# Patient Record
Sex: FEMALE | Race: WHITE | NOT HISPANIC OR LATINO | Employment: FULL TIME | URBAN - METROPOLITAN AREA
[De-identification: names, ages, dates, MRNs, and addresses within clinical notes are randomized per-mention and may not be internally consistent; named-entity substitution may affect disease eponyms.]

---

## 2017-01-01 ENCOUNTER — TELEPHONE (OUTPATIENT)
Dept: ONCOLOGY | Facility: CLINIC | Age: 57
End: 2017-01-01

## 2017-01-01 ENCOUNTER — INFUSION (OUTPATIENT)
Dept: ONCOLOGY | Facility: HOSPITAL | Age: 57
End: 2017-01-01

## 2017-01-01 ENCOUNTER — OFFICE VISIT (OUTPATIENT)
Dept: ONCOLOGY | Facility: CLINIC | Age: 57
End: 2017-01-01

## 2017-01-01 ENCOUNTER — LAB (OUTPATIENT)
Dept: LAB | Facility: HOSPITAL | Age: 57
End: 2017-01-01

## 2017-01-01 ENCOUNTER — HOSPITAL ENCOUNTER (OUTPATIENT)
Dept: MRI IMAGING | Facility: HOSPITAL | Age: 57
Discharge: HOME | End: 2017-07-17
Attending: INTERNAL MEDICINE | Admitting: INTERNAL MEDICINE

## 2017-01-01 ENCOUNTER — HOSPITAL ENCOUNTER (OUTPATIENT)
Dept: RADIATION ONCOLOGY | Facility: HOSPITAL | Age: 57
Setting detail: RADIATION/ONCOLOGY SERIES
Discharge: HOME | End: 2017-01-05

## 2017-01-01 ENCOUNTER — HOSPITAL ENCOUNTER (OUTPATIENT)
Dept: MRI IMAGING | Facility: HOSPITAL | Age: 57
Discharge: HOME | End: 2017-02-13
Attending: INTERNAL MEDICINE | Admitting: INTERNAL MEDICINE

## 2017-01-01 ENCOUNTER — HOSPITAL ENCOUNTER (OUTPATIENT)
Dept: MRI IMAGING | Facility: HOSPITAL | Age: 57
Discharge: HOME | End: 2017-04-10
Attending: INTERNAL MEDICINE | Admitting: INTERNAL MEDICINE

## 2017-01-01 ENCOUNTER — OFFICE VISIT (OUTPATIENT)
Dept: RADIATION ONCOLOGY | Facility: HOSPITAL | Age: 57
End: 2017-01-01

## 2017-01-01 ENCOUNTER — OFFICE VISIT (OUTPATIENT)
Dept: SURGERY | Facility: CLINIC | Age: 57
End: 2017-01-01

## 2017-01-01 ENCOUNTER — HOSPITAL ENCOUNTER (OUTPATIENT)
Dept: ONCOLOGY | Facility: HOSPITAL | Age: 57
Setting detail: INFUSION SERIES
Discharge: HOME | End: 2017-08-25

## 2017-01-01 VITALS
BODY MASS INDEX: 32.28 KG/M2 | RESPIRATION RATE: 16 BRPM | HEART RATE: 67 BPM | DIASTOLIC BLOOD PRESSURE: 67 MMHG | TEMPERATURE: 97.1 F | WEIGHT: 200 LBS | TEMPERATURE: 98 F | HEART RATE: 75 BPM | DIASTOLIC BLOOD PRESSURE: 90 MMHG | SYSTOLIC BLOOD PRESSURE: 163 MMHG | WEIGHT: 208 LBS | BODY MASS INDEX: 33.57 KG/M2 | SYSTOLIC BLOOD PRESSURE: 131 MMHG | RESPIRATION RATE: 16 BRPM

## 2017-01-01 VITALS
DIASTOLIC BLOOD PRESSURE: 63 MMHG | RESPIRATION RATE: 16 BRPM | TEMPERATURE: 97.1 F | BODY MASS INDEX: 30.85 KG/M2 | HEART RATE: 81 BPM | WEIGHT: 208 LBS | DIASTOLIC BLOOD PRESSURE: 77 MMHG | BODY MASS INDEX: 33.57 KG/M2 | WEIGHT: 197 LBS | SYSTOLIC BLOOD PRESSURE: 125 MMHG | HEART RATE: 71 BPM | SYSTOLIC BLOOD PRESSURE: 146 MMHG | RESPIRATION RATE: 19 BRPM | TEMPERATURE: 97.1 F

## 2017-01-01 VITALS — SYSTOLIC BLOOD PRESSURE: 174 MMHG | DIASTOLIC BLOOD PRESSURE: 78 MMHG

## 2017-01-01 VITALS
TEMPERATURE: 97.5 F | RESPIRATION RATE: 19 BRPM | WEIGHT: 212 LBS | DIASTOLIC BLOOD PRESSURE: 78 MMHG | BODY MASS INDEX: 34.22 KG/M2 | SYSTOLIC BLOOD PRESSURE: 153 MMHG | HEART RATE: 73 BPM

## 2017-01-01 VITALS
HEIGHT: 66 IN | OXYGEN SATURATION: 98 % | DIASTOLIC BLOOD PRESSURE: 70 MMHG | WEIGHT: 207 LBS | TEMPERATURE: 97.7 F | BODY MASS INDEX: 33.27 KG/M2 | HEART RATE: 84 BPM | SYSTOLIC BLOOD PRESSURE: 102 MMHG

## 2017-01-01 VITALS
HEART RATE: 76 BPM | DIASTOLIC BLOOD PRESSURE: 60 MMHG | TEMPERATURE: 97.4 F | SYSTOLIC BLOOD PRESSURE: 125 MMHG | RESPIRATION RATE: 18 BRPM

## 2017-01-01 VITALS
RESPIRATION RATE: 19 BRPM | HEART RATE: 70 BPM | SYSTOLIC BLOOD PRESSURE: 151 MMHG | DIASTOLIC BLOOD PRESSURE: 67 MMHG | DIASTOLIC BLOOD PRESSURE: 87 MMHG | SYSTOLIC BLOOD PRESSURE: 143 MMHG | TEMPERATURE: 97.6 F | BODY MASS INDEX: 36.15 KG/M2 | WEIGHT: 224 LBS | WEIGHT: 213 LBS | TEMPERATURE: 97.6 F | BODY MASS INDEX: 34.38 KG/M2 | HEART RATE: 87 BPM | RESPIRATION RATE: 18 BRPM

## 2017-01-01 VITALS
SYSTOLIC BLOOD PRESSURE: 137 MMHG | DIASTOLIC BLOOD PRESSURE: 88 MMHG | BODY MASS INDEX: 37.45 KG/M2 | HEART RATE: 88 BPM | WEIGHT: 232 LBS | RESPIRATION RATE: 18 BRPM | TEMPERATURE: 98.7 F

## 2017-01-01 VITALS — SYSTOLIC BLOOD PRESSURE: 141 MMHG | DIASTOLIC BLOOD PRESSURE: 66 MMHG

## 2017-01-01 VITALS
WEIGHT: 187 LBS | HEIGHT: 67 IN | SYSTOLIC BLOOD PRESSURE: 138 MMHG | DIASTOLIC BLOOD PRESSURE: 92 MMHG | TEMPERATURE: 97.4 F | RESPIRATION RATE: 18 BRPM | BODY MASS INDEX: 29.35 KG/M2 | HEART RATE: 106 BPM

## 2017-01-01 VITALS
TEMPERATURE: 98.5 F | DIASTOLIC BLOOD PRESSURE: 70 MMHG | RESPIRATION RATE: 16 BRPM | BODY MASS INDEX: 34.7 KG/M2 | SYSTOLIC BLOOD PRESSURE: 136 MMHG | HEART RATE: 82 BPM | WEIGHT: 215 LBS

## 2017-01-01 VITALS — DIASTOLIC BLOOD PRESSURE: 72 MMHG | SYSTOLIC BLOOD PRESSURE: 126 MMHG | HEART RATE: 75 BPM

## 2017-01-01 VITALS
TEMPERATURE: 97.6 F | BODY MASS INDEX: 36.48 KG/M2 | RESPIRATION RATE: 16 BRPM | DIASTOLIC BLOOD PRESSURE: 84 MMHG | WEIGHT: 226 LBS | HEART RATE: 72 BPM | SYSTOLIC BLOOD PRESSURE: 182 MMHG

## 2017-01-01 VITALS
BODY MASS INDEX: 35.35 KG/M2 | TEMPERATURE: 97.1 F | WEIGHT: 219 LBS | HEART RATE: 91 BPM | DIASTOLIC BLOOD PRESSURE: 70 MMHG | RESPIRATION RATE: 18 BRPM | SYSTOLIC BLOOD PRESSURE: 128 MMHG

## 2017-01-01 VITALS
TEMPERATURE: 97.5 F | HEART RATE: 73 BPM | RESPIRATION RATE: 20 BRPM | DIASTOLIC BLOOD PRESSURE: 58 MMHG | SYSTOLIC BLOOD PRESSURE: 109 MMHG

## 2017-01-01 VITALS
SYSTOLIC BLOOD PRESSURE: 137 MMHG | RESPIRATION RATE: 16 BRPM | WEIGHT: 225 LBS | DIASTOLIC BLOOD PRESSURE: 76 MMHG | HEART RATE: 83 BPM | BODY MASS INDEX: 36.32 KG/M2 | TEMPERATURE: 97.8 F

## 2017-01-01 VITALS
HEART RATE: 82 BPM | TEMPERATURE: 98.1 F | WEIGHT: 228 LBS | HEIGHT: 66 IN | RESPIRATION RATE: 18 BRPM | SYSTOLIC BLOOD PRESSURE: 135 MMHG | BODY MASS INDEX: 36.64 KG/M2 | DIASTOLIC BLOOD PRESSURE: 63 MMHG

## 2017-01-01 VITALS
DIASTOLIC BLOOD PRESSURE: 72 MMHG | SYSTOLIC BLOOD PRESSURE: 145 MMHG | HEART RATE: 73 BPM | BODY MASS INDEX: 36.8 KG/M2 | RESPIRATION RATE: 18 BRPM | TEMPERATURE: 97.8 F | WEIGHT: 228 LBS

## 2017-01-01 VITALS
DIASTOLIC BLOOD PRESSURE: 84 MMHG | BODY MASS INDEX: 30.07 KG/M2 | HEART RATE: 77 BPM | WEIGHT: 192 LBS | RESPIRATION RATE: 19 BRPM | TEMPERATURE: 97.2 F | SYSTOLIC BLOOD PRESSURE: 183 MMHG

## 2017-01-01 VITALS
DIASTOLIC BLOOD PRESSURE: 85 MMHG | RESPIRATION RATE: 15 BRPM | WEIGHT: 216 LBS | BODY MASS INDEX: 34.86 KG/M2 | SYSTOLIC BLOOD PRESSURE: 132 MMHG | TEMPERATURE: 98 F | HEART RATE: 80 BPM

## 2017-01-01 VITALS — DIASTOLIC BLOOD PRESSURE: 73 MMHG | HEART RATE: 68 BPM | SYSTOLIC BLOOD PRESSURE: 127 MMHG

## 2017-01-01 VITALS — DIASTOLIC BLOOD PRESSURE: 78 MMHG | SYSTOLIC BLOOD PRESSURE: 134 MMHG | HEART RATE: 82 BPM

## 2017-01-01 VITALS
TEMPERATURE: 97.8 F | SYSTOLIC BLOOD PRESSURE: 174 MMHG | RESPIRATION RATE: 18 BRPM | HEART RATE: 73 BPM | DIASTOLIC BLOOD PRESSURE: 74 MMHG

## 2017-01-01 VITALS
DIASTOLIC BLOOD PRESSURE: 74 MMHG | SYSTOLIC BLOOD PRESSURE: 153 MMHG | WEIGHT: 224 LBS | RESPIRATION RATE: 16 BRPM | BODY MASS INDEX: 36.15 KG/M2 | HEART RATE: 73 BPM | TEMPERATURE: 97.3 F

## 2017-01-01 VITALS — DIASTOLIC BLOOD PRESSURE: 64 MMHG | SYSTOLIC BLOOD PRESSURE: 136 MMHG

## 2017-01-01 DIAGNOSIS — C71.9 GBM (GLIOBLASTOMA MULTIFORME) (HCC): Primary | ICD-10-CM

## 2017-01-01 DIAGNOSIS — C71.9 GLIOBLASTOMA MULTIFORME (HCC): Primary | ICD-10-CM

## 2017-01-01 DIAGNOSIS — C71.9 GLIOBLASTOMA MULTIFORME (HCC): ICD-10-CM

## 2017-01-01 DIAGNOSIS — E86.0 DEHYDRATION: ICD-10-CM

## 2017-01-01 DIAGNOSIS — G93.89 BRAIN MASS: ICD-10-CM

## 2017-01-01 DIAGNOSIS — E86.0 DEHYDRATION: Primary | ICD-10-CM

## 2017-01-01 DIAGNOSIS — R00.1 BRADYCARDIA: Primary | ICD-10-CM

## 2017-01-01 DIAGNOSIS — K52.9 CHRONIC DIARRHEA: Primary | ICD-10-CM

## 2017-01-01 LAB
ANION GAP SERPL CALCULATED.3IONS-SCNC: 9 MMOL/L (ref 3–11)
AUTO MIXED CELLS #: 0.6 10*3/UL (ref 0.1–1.5)
AUTO MIXED CELLS %: 12.6 % (ref 0–16)
BILIRUB UR QL STRIP: NEGATIVE
BUN BLD-MCNC: 14 MG/DL (ref 9–23)
BUN/CREAT SERPL: 15.6 (ref 7–25)
CALCIUM SPEC-SCNC: 9.2 MG/DL (ref 8.7–10.4)
CHLORIDE SERPL-SCNC: 101 MMOL/L (ref 99–109)
CLARITY UR: CLEAR
CO2 SERPL-SCNC: 30 MMOL/L (ref 20–31)
COLOR UR: YELLOW
CREAT BLD-MCNC: 0.9 MG/DL (ref 0.6–1.3)
CREAT BLDA-MCNC: 0.8 MG/DL (ref 0.6–1.3)
CREAT BLDA-MCNC: 0.9 MG/DL (ref 0.6–1.3)
CREAT BLDA-MCNC: 1 MG/DL (ref 0.6–1.3)
CREAT SERPL-MCNC: 0.8 MG/DL
ERYTHROCYTE [DISTWIDTH] IN BLOOD BY AUTOMATED COUNT: 14.4 % (ref 11.3–14.5)
GFR SERPL CREATININE-BSD FRML MDRD: 65 ML/MIN/1.73
GLUCOSE BLD-MCNC: 196 MG/DL (ref 70–100)
GLUCOSE UR STRIP-MCNC: NEGATIVE MG/DL
HCT VFR BLD AUTO: 36.4 % (ref 34.5–44)
HGB BLD-MCNC: 12.9 G/DL (ref 11.5–15.5)
HGB UR QL STRIP.AUTO: NEGATIVE
KETONES UR QL STRIP: NEGATIVE
LEUKOCYTE ESTERASE UR QL STRIP.AUTO: NEGATIVE
LYMPHOCYTES # BLD AUTO: 1.5 10*3/MM3 (ref 0.6–4.8)
LYMPHOCYTES NFR BLD AUTO: 30.3 % (ref 24–44)
MCH RBC QN AUTO: 30.6 PG (ref 27–31)
MCHC RBC AUTO-ENTMCNC: 35.4 G/DL (ref 32–36)
MCV RBC AUTO: 86.5 FL (ref 80–99)
NEUTROPHILS # BLD AUTO: 2.9 10*3/MM3 (ref 1.5–8.3)
NEUTROPHILS NFR BLD AUTO: 57.1 % (ref 41–71)
NITRITE UR QL STRIP: NEGATIVE
PH UR STRIP.AUTO: 5.5 [PH] (ref 5–8)
PLATELET # BLD AUTO: 168 10*3/MM3 (ref 150–450)
PMV BLD AUTO: 9.3 FL (ref 6–12)
POTASSIUM BLD-SCNC: 3.6 MMOL/L (ref 3.5–5.5)
PROT UR QL STRIP: NEGATIVE
RBC # BLD AUTO: 4.21 10*6/MM3 (ref 3.89–5.14)
SODIUM BLD-SCNC: 140 MMOL/L (ref 132–146)
SP GR UR STRIP: 1.02 (ref 1–1.03)
T4 FREE SERPL-MCNC: 1.29 NG/DL (ref 0.89–1.76)
TSH SERPL DL<=0.05 MIU/L-ACNC: 2.6 MIU/ML (ref 0.35–5.35)
UROBILINOGEN UR QL STRIP: NORMAL
WBC NRBC COR # BLD: 5 10*3/MM3 (ref 3.5–10.8)

## 2017-01-01 PROCEDURE — 99215 OFFICE O/P EST HI 40 MIN: CPT | Performed by: INTERNAL MEDICINE

## 2017-01-01 PROCEDURE — 25010000002 NIVOLUMAB 100 MG/10ML SOLUTION 4 ML VIAL: Performed by: INTERNAL MEDICINE

## 2017-01-01 PROCEDURE — 96415 CHEMO IV INFUSION ADDL HR: CPT

## 2017-01-01 PROCEDURE — 82565 ASSAY OF CREATININE: CPT

## 2017-01-01 PROCEDURE — A9577 INJ MULTIHANCE: HCPCS | Performed by: INTERNAL MEDICINE

## 2017-01-01 PROCEDURE — 25010000002 NIVOLUMAB 40 MG/4ML SOLUTION 4 ML VIAL: Performed by: INTERNAL MEDICINE

## 2017-01-01 PROCEDURE — 96409 CHEMO IV PUSH SNGL DRUG: CPT

## 2017-01-01 PROCEDURE — 25010000002 BEVACIZUMAB PER 10 MG: Performed by: INTERNAL MEDICINE

## 2017-01-01 PROCEDURE — 25010000002 NIVOLUMAB 40 MG/4ML SOLUTION 10 ML VIAL: Performed by: INTERNAL MEDICINE

## 2017-01-01 PROCEDURE — 96413 CHEMO IV INFUSION 1 HR: CPT

## 2017-01-01 PROCEDURE — 96417 CHEMO IV INFUS EACH ADDL SEQ: CPT

## 2017-01-01 PROCEDURE — 25010000002 NIVOLUMAB 100 MG/10ML SOLUTION 10 ML VIAL: Performed by: INTERNAL MEDICINE

## 2017-01-01 PROCEDURE — 96361 HYDRATE IV INFUSION ADD-ON: CPT

## 2017-01-01 PROCEDURE — 70553 MRI BRAIN STEM W/O & W/DYE: CPT

## 2017-01-01 PROCEDURE — 96360 HYDRATION IV INFUSION INIT: CPT

## 2017-01-01 PROCEDURE — 0 GADOBENATE DIMEGLUMINE 529 MG/ML SOLUTION: Performed by: INTERNAL MEDICINE

## 2017-01-01 PROCEDURE — 36415 COLL VENOUS BLD VENIPUNCTURE: CPT

## 2017-01-01 PROCEDURE — 25010000002 BEVACIZUMAB 100 MG/4ML SOLUTION 16 ML VIAL: Performed by: INTERNAL MEDICINE

## 2017-01-01 PROCEDURE — 85025 COMPLETE CBC W/AUTO DIFF WBC: CPT

## 2017-01-01 PROCEDURE — 80048 BASIC METABOLIC PNL TOTAL CA: CPT | Performed by: INTERNAL MEDICINE

## 2017-01-01 PROCEDURE — 81003 URINALYSIS AUTO W/O SCOPE: CPT

## 2017-01-01 PROCEDURE — 99244 OFF/OP CNSLTJ NEW/EST MOD 40: CPT | Performed by: SURGERY

## 2017-01-01 PROCEDURE — 25010000002 BEVACIZUMAB 100 MG/4ML SOLUTION 4 ML VIAL: Performed by: INTERNAL MEDICINE

## 2017-01-01 PROCEDURE — 84443 ASSAY THYROID STIM HORMONE: CPT | Performed by: INTERNAL MEDICINE

## 2017-01-01 PROCEDURE — 84439 ASSAY OF FREE THYROXINE: CPT | Performed by: INTERNAL MEDICINE

## 2017-01-01 RX ORDER — SODIUM CHLORIDE 9 MG/ML
250 INJECTION, SOLUTION INTRAVENOUS ONCE
Status: DISCONTINUED | OUTPATIENT
Start: 2017-01-01 | End: 2017-01-01 | Stop reason: HOSPADM

## 2017-01-01 RX ORDER — DIPHENOXYLATE HYDROCHLORIDE AND ATROPINE SULFATE 2.5; .025 MG/1; MG/1
1 TABLET ORAL 4 TIMES DAILY PRN
Qty: 90 TABLET | Refills: 2 | OUTPATIENT
Start: 2017-01-01 | End: 2017-01-01

## 2017-01-01 RX ORDER — SODIUM CHLORIDE 9 MG/ML
250 INJECTION, SOLUTION INTRAVENOUS ONCE
OUTPATIENT
Start: 2017-01-01

## 2017-01-01 RX ORDER — LISINOPRIL 40 MG/1
40 TABLET ORAL DAILY
COMMUNITY

## 2017-01-01 RX ORDER — MEGESTROL ACETATE 40 MG/ML
SUSPENSION ORAL
Refills: 0 | COMMUNITY
Start: 2017-01-01 | End: 2017-01-01

## 2017-01-01 RX ORDER — HYDROCHLOROTHIAZIDE 25 MG/1
TABLET ORAL
Refills: 0 | COMMUNITY
Start: 2017-01-01 | End: 2017-01-01

## 2017-01-01 RX ORDER — SODIUM CHLORIDE 9 MG/ML
250 INJECTION, SOLUTION INTRAVENOUS ONCE
Status: CANCELLED | OUTPATIENT
Start: 2017-01-01

## 2017-01-01 RX ORDER — DRONABINOL 2.5 MG/1
2.5 CAPSULE ORAL
Qty: 60 CAPSULE | Refills: 0 | Status: SHIPPED | OUTPATIENT
Start: 2017-01-01

## 2017-01-01 RX ORDER — DEXAMETHASONE 1 MG
1 TABLET ORAL
Qty: 30 TABLET | Refills: 2 | Status: SHIPPED | OUTPATIENT
Start: 2017-01-01 | End: 2017-01-01 | Stop reason: SDUPTHER

## 2017-01-01 RX ORDER — AMOXICILLIN 875 MG/1
TABLET, COATED ORAL
COMMUNITY
Start: 2017-01-01 | End: 2017-01-01

## 2017-01-01 RX ORDER — SODIUM CHLORIDE 9 MG/ML
1000 INJECTION, SOLUTION INTRAVENOUS CONTINUOUS
Status: DISCONTINUED | OUTPATIENT
Start: 2017-01-01 | End: 2017-01-01 | Stop reason: HOSPADM

## 2017-01-01 RX ORDER — SULFAMETHOXAZOLE AND TRIMETHOPRIM 800; 160 MG/1; MG/1
TABLET ORAL
COMMUNITY
Start: 2017-01-01 | End: 2017-01-01

## 2017-01-01 RX ORDER — DEXAMETHASONE 1 MG
0.5 TABLET ORAL
Qty: 30 TABLET | Refills: 2 | Status: SHIPPED | OUTPATIENT
Start: 2017-01-01 | End: 2017-01-01

## 2017-01-01 RX ORDER — DRONABINOL 2.5 MG/1
2.5 CAPSULE ORAL
Qty: 60 CAPSULE | Refills: 0 | Status: SHIPPED | OUTPATIENT
Start: 2017-01-01 | End: 2017-01-01 | Stop reason: SDUPTHER

## 2017-01-01 RX ORDER — HYDROCHLOROTHIAZIDE 25 MG/1
TABLET ORAL
COMMUNITY
Start: 2017-01-01 | End: 2017-01-01

## 2017-01-01 RX ORDER — AMOXICILLIN 875 MG/1
875 TABLET, COATED ORAL 2 TIMES DAILY
COMMUNITY
End: 2017-01-01

## 2017-01-01 RX ADMIN — SODIUM CHLORIDE 240 MG: 9 INJECTION, SOLUTION INTRAVENOUS at 10:06

## 2017-01-01 RX ADMIN — BEVACIZUMAB 1050 MG: 400 INJECTION, SOLUTION INTRAVENOUS at 10:37

## 2017-01-01 RX ADMIN — BEVACIZUMAB 1050 MG: 400 INJECTION, SOLUTION INTRAVENOUS at 10:58

## 2017-01-01 RX ADMIN — BEVACIZUMAB 1050 MG: 400 INJECTION, SOLUTION INTRAVENOUS at 10:52

## 2017-01-01 RX ADMIN — GADOBENATE DIMEGLUMINE 20 ML: 529 INJECTION, SOLUTION INTRAVENOUS at 16:00

## 2017-01-01 RX ADMIN — SODIUM CHLORIDE 1000 ML: 9 INJECTION, SOLUTION INTRAVENOUS at 09:30

## 2017-01-01 RX ADMIN — SODIUM CHLORIDE 240 MG: 9 INJECTION, SOLUTION INTRAVENOUS at 09:53

## 2017-01-01 RX ADMIN — SODIUM CHLORIDE 1000 ML: 9 INJECTION, SOLUTION INTRAVENOUS at 14:13

## 2017-01-01 RX ADMIN — SODIUM CHLORIDE 240 MG: 9 INJECTION, SOLUTION INTRAVENOUS at 10:32

## 2017-01-01 RX ADMIN — SODIUM CHLORIDE: 9 INJECTION, SOLUTION INTRAVENOUS at 15:25

## 2017-01-01 RX ADMIN — SODIUM CHLORIDE 1000 ML: 9 INJECTION, SOLUTION INTRAVENOUS at 13:16

## 2017-01-01 RX ADMIN — SODIUM CHLORIDE 240 MG: 9 INJECTION, SOLUTION INTRAVENOUS at 10:50

## 2017-01-01 RX ADMIN — SODIUM CHLORIDE 240 MG: 9 INJECTION, SOLUTION INTRAVENOUS at 09:58

## 2017-01-01 RX ADMIN — SODIUM CHLORIDE 240 MG: 9 INJECTION, SOLUTION INTRAVENOUS at 09:43

## 2017-01-01 RX ADMIN — BEVACIZUMAB 1050 MG: 400 INJECTION, SOLUTION INTRAVENOUS at 11:08

## 2017-01-01 RX ADMIN — BEVACIZUMAB 1050 MG: 400 INJECTION, SOLUTION INTRAVENOUS at 10:47

## 2017-01-01 RX ADMIN — GADOBENATE DIMEGLUMINE 17 ML: 529 INJECTION, SOLUTION INTRAVENOUS at 09:33

## 2017-01-01 RX ADMIN — BEVACIZUMAB 1050 MG: 400 INJECTION, SOLUTION INTRAVENOUS at 11:14

## 2017-01-01 RX ADMIN — BEVACIZUMAB 1050 MG: 400 INJECTION, SOLUTION INTRAVENOUS at 11:04

## 2017-01-01 RX ADMIN — BEVACIZUMAB 1050 MG: 400 INJECTION, SOLUTION INTRAVENOUS at 11:12

## 2017-01-01 RX ADMIN — BEVACIZUMAB 1050 MG: 400 INJECTION, SOLUTION INTRAVENOUS at 12:06

## 2017-01-01 RX ADMIN — GADOBENATE DIMEGLUMINE 20 ML: 529 INJECTION, SOLUTION INTRAVENOUS at 09:30

## 2017-01-01 RX ADMIN — SODIUM CHLORIDE 240 MG: 9 INJECTION, SOLUTION INTRAVENOUS at 09:57

## 2017-01-01 RX ADMIN — SODIUM CHLORIDE 240 MG: 9 INJECTION, SOLUTION INTRAVENOUS at 11:03

## 2017-01-01 RX ADMIN — SODIUM CHLORIDE 240 MG: 9 INJECTION, SOLUTION INTRAVENOUS at 09:59

## 2017-01-01 RX ADMIN — SODIUM CHLORIDE 240 MG: 9 INJECTION, SOLUTION INTRAVENOUS at 09:52

## 2017-01-01 RX ADMIN — SODIUM CHLORIDE 240 MG: 9 INJECTION, SOLUTION INTRAVENOUS at 10:02

## 2017-01-01 RX ADMIN — SODIUM CHLORIDE 240 MG: 9 INJECTION, SOLUTION INTRAVENOUS at 09:41

## 2017-01-01 RX ADMIN — SODIUM CHLORIDE 1000 ML: 9 INJECTION, SOLUTION INTRAVENOUS at 09:49

## 2017-01-01 RX ADMIN — SODIUM CHLORIDE 1000 ML: 9 INJECTION, SOLUTION INTRAVENOUS at 14:45

## 2017-01-01 RX ADMIN — BEVACIZUMAB 1050 MG: 400 INJECTION, SOLUTION INTRAVENOUS at 12:12

## 2017-01-01 RX ADMIN — SODIUM CHLORIDE 240 MG: 9 INJECTION, SOLUTION INTRAVENOUS at 09:26

## 2017-01-01 RX ADMIN — SODIUM CHLORIDE 240 MG: 9 INJECTION, SOLUTION INTRAVENOUS at 11:08

## 2017-01-01 RX ADMIN — BEVACIZUMAB 1050 MG: 400 INJECTION, SOLUTION INTRAVENOUS at 11:39

## 2017-01-01 RX ADMIN — BEVACIZUMAB 1050 MG: 400 INJECTION, SOLUTION INTRAVENOUS at 11:24

## 2017-01-01 RX ADMIN — BEVACIZUMAB 1050 MG: 400 INJECTION, SOLUTION INTRAVENOUS at 11:11

## 2017-01-01 RX ADMIN — SODIUM CHLORIDE 240 MG: 9 INJECTION, SOLUTION INTRAVENOUS at 09:51

## 2017-01-01 RX ADMIN — BEVACIZUMAB 1050 MG: 400 INJECTION, SOLUTION INTRAVENOUS at 11:56

## 2017-01-04 NOTE — PROGRESS NOTES
DATE OF VISIT: 1/4/2017    REASON FOR VISIT: Followup for GBM     HISTORY OF PRESENT ILLNESS: The patient is a very pleasant 56 y.o. female  who was in her usual state of health until approximately 1 month ago. The patient presented to [facility] emergency department with complaints of disorientation, headache, loss of peripheral vision, and confusion. This has been present over the past two to three weeks. She also noted she had a general feeling of poor health over the past two months and saw her primary care physician who diagnosed her with diabetes as well as hypertension. An MRI of the brain was ordered through the ER that showed a 2x3.7 cm ring-enhancing mass to the right occipital region with surrounding vasogenic edema. The patient underwent craniotomy with tumor debulking by Dr. Casas on 9/21/2016 with final pathology revealing glioblastoma multiforme. The patient had no post-operative complications. She was started on concurrent Temodar daily with brain irradiation on 10/17/2016.  This was completed November 26, 2016.  Follow-up MRI done on December 12, 2016 showed progressive disease.  Patient was started on second line treatment using Opdivo + Avastin on 12/21/2016.  The patient is here today for cycle number 2.    SUBJECTIVE: The patient has been doing really well.  Her double vision is gone, her headaches are gone as well.  She denied any bleeding. She has had no skin rash, (+)nausea, no vomiting, no progressive headaches.     PAST MEDICAL HISTORY/SOCIAL HISTORY/FAMILY HISTORY: Unchanged from my prior documentation done on 10/07/2016    Review of Systems   Constitutional: Negative for activity change, appetite change, chills, fatigue, fever and unexpected weight change.   HENT: Negative for hearing loss, mouth sores, nosebleeds, sore throat and trouble swallowing.    Eyes: Negative for visual disturbance.   Respiratory: Negative for cough, chest tightness, shortness of breath and wheezing.     Cardiovascular: Negative for chest pain, palpitations and leg swelling.   Gastrointestinal: Positive for constipation. Negative for abdominal distention, abdominal pain, blood in stool, diarrhea, nausea, rectal pain and vomiting.   Endocrine: Negative for cold intolerance and heat intolerance.   Genitourinary: Negative for difficulty urinating, dysuria, frequency and urgency.   Musculoskeletal: Negative for arthralgias, back pain, gait problem, joint swelling and myalgias.   Skin: Negative for rash.   Neurological: Positive for headaches. Negative for dizziness, tremors, syncope, weakness, light-headedness and numbness.   Hematological: Negative for adenopathy. Does not bruise/bleed easily.   Psychiatric/Behavioral: Negative for confusion, sleep disturbance and suicidal ideas. The patient is not nervous/anxious.          Current Outpatient Prescriptions:   •  dexamethasone (DECADRON) 2 MG tablet, Take 0.5 tablets by mouth 2 (Two) Times a Day With Meals., Disp: 30 tablet, Rfl: 2  •  docusate sodium (COLACE) 100 MG capsule, Take 1 capsule by mouth 2 (Two) Times a Day. Two capusles, Disp: 60 capsule, Rfl: 3  •  glipiZIDE (GLUCOTROL) 5 MG ER tablet, take 1 tablet by mouth once daily, Disp: , Rfl: 0  •  metFORMIN (GLUCOPHAGE) 1000 MG tablet, Take 1,000 mg by mouth Daily., Disp: , Rfl: 0  •  ondansetron (ZOFRAN) 8 MG tablet, Take 1 tablet by mouth As Needed., Disp: , Rfl: 0    PHYSICAL EXAMINATION:   There were no vitals taken for this visit.   ECOG Performance Status: 1 - Symptomatic but completely ambulatory  General Appearance:  alert, cooperative, no apparent distress and appears stated age   Neurologic/Psychiatric: A&O x 3, gait steady, appropriate affect, strength 5/5 in all muscle groups   HEENT:  Normocephalic, without obvious abnormality, mucous membranes moist   Neck: Supple, symmetrical, trachea midline, no adenopathy;  No thyromegaly, masses, or tenderness   Lungs:   Clear to auscultation bilaterally;  respirations regular, even, and unlabored bilaterally   Heart:  Regular rate and rhythm, no murmurs appreciated   Abdomen:   Soft, non-tender, non-distended and no organomegaly   Lymph nodes: No cervical, supraclavicular, inguinal or axillary adenopathy noted   Extremities: Normal, atraumatic; no clubbing, cyanosis, or edema    Skin: No rashes, ulcers, or suspicious lesions noted     Lab on 01/04/2017   Component Date Value Ref Range Status   • Color, UA 01/04/2017 Yellow  Yellow, Straw Final   • Appearance, UA 01/04/2017 Clear  Clear Final   • pH, UA 01/04/2017 5.5  5.0 - 8.0 Final   • Specific Gravity, UA 01/04/2017 1.020  1.005 - 1.030 Final   • Glucose, UA 01/04/2017 Negative  Negative Final   • Ketones, UA 01/04/2017 Negative  Negative Final   • Bilirubin, UA 01/04/2017 Negative  Negative Final   • Blood, UA 01/04/2017 Negative  Negative Final   • Protein, UA 01/04/2017 Negative  Negative Final   • Leuk Esterase, UA 01/04/2017 Negative  Negative Final   • Nitrite, UA 01/04/2017 Negative  Negative Final   • Urobilinogen, UA 01/04/2017 0.2 E.U./dL  0.2 - 1.0 E.U./dL Final   • WBC 01/04/2017 5.00  3.50 - 10.80 10*3/mm3 Final   • RBC 01/04/2017 4.21  3.89 - 5.14 10*6/mm3 Final   • Hemoglobin 01/04/2017 12.9  11.5 - 15.5 g/dL Final   • Hematocrit 01/04/2017 36.4  34.5 - 44.0 % Final   • MCV 01/04/2017 86.5  80.0 - 99.0 fL Final   • MCH 01/04/2017 30.6  27.0 - 31.0 pg Final   • MCHC 01/04/2017 35.4  32.0 - 36.0 g/dL Final   • RDW 01/04/2017 14.4  11.3 - 14.5 % Final   • MPV 01/04/2017 9.3  6.0 - 12.0 fL Final   • Platelets 01/04/2017 168  150 - 450 10*3/mm3 Final   • Neutrophil % 01/04/2017 57.1  41.0 - 71.0 % Final   • Lymphocyte % 01/04/2017 30.3  24.0 - 44.0 % Final   • Auto Mixed Cells % 01/04/2017 12.6  0.0 - 16.0 % Final   • Neutrophils, Absolute 01/04/2017 2.90  1.50 - 8.30 10*3/mm3 Final   • Lymphocytes, Absolute 01/04/2017 1.50  0.60 - 4.80 10*3/mm3 Final   • Auto Mixed Cells # 01/04/2017 0.60  0.10 - 1.50  10*3/uL Final     Mri Stealth Brain W Wo Contrast    Result Date: 12/12/2016  Narrative: EXAMINATION:  MR DATA SETS OF THE BRAIN WITHOUT AND WITH CONTRAST-12/12/2016:  HISTORY: Followup glioblastoma, followup chemotherapy and radiation, restaging of brain malignancy.  TECHNIQUE: MR data sets of the brain were performed without and with intravenous contrast.  20 mL of MultiHance was administered for contrast enhancement.  FINDINGS: 1.  The diffusion weighted sequence demonstrates restricted diffusion in the area of previous resection of intra-axial rightward brain mass in the right posterior parietal and occipital region. ADC mapping exam demonstrates diffuse increased signal in this region.  2.  The foramen magnum and cervicomedullary junction remain clear and patent. The midline structures are unremarkable.  3.  FLAIR data sets demonstrate marked increased FLAIR signal in the splenium of the corpus callosum and diffusely throughout the right posterior parietal and occipital lobe with mass effect. T1 data sets demonstrate no evidence of acute hemorrhage. There is global mass effect and edema in the right hemisphere, however.  4.  Contrast enhanced data sets demonstrate intense abnormal perimeter ill defined enhancement in the lesion involving the right posterior temporal region, the peritrigonal white matter on the right, the posterior parietal region on the right and the occipital lobe extensively. This intensely enhancing lesion involves the central and rightward aspect of the splenium of the corpus callosum and is surrounded by significant edema and mass effect.  Compared to previous examinations of 09/20/2016, there has been mild progression of the enhancing lesion in the right hemisphere into the posterior right temporal lobe and there are associated new enhancing nodules in the upper right parietal and occipital involvement. Therefore, there has been some progression of the enhancing lesion, although most of  the lesion is otherwise stable. The involvement of the splenium of the corpus callosum now extends to and slightly past the midline which is a sign of further progression.      Impression: 1.  Slight progression of the intense enhancing ill defined mass lesion in the right hemisphere which now involves more of the right temporal lobe, more of the splenium of the central and rightward corpus callosum, and more nodular enhancing lesions in the upper right parietal and occipital region. Overall mass effect and edema has also slightly increased. 2.  Other findings as noted above.  D:  12/12/2016 E:  12/12/2016  This report was finalized on 12/12/2016 1:04 PM by Dr. Jerrod Cormier MD.    (    ASSESSMENT: The patient is a very pleasant 56 y.o. female with glioblastoma multiforme.     PROBLEM LIST:   1. Presented with confusion, visual change, and disorientation.   2. Right occipital mass found on MRI brain with surrounding vasogenic edema and mass effect  3. Status post craniotomy done by Dr. Casas on 9/21/2016  4. Final pathology shows glioblastoma multiforme.   5. Started concurrent Temodar with radiation 10/17/2016.  This was completed on November 26, 2016  6.  Progressive disease documented MRI brain done December 12, 2016.  7.  Started Opdivo with Avastin 12/21/2016. Status post 1 cycle.  8.  Type 2 diabetes     PLAN:   1.  I will proceed with treatment today using Opdivo plus Avastin q 2 weeks.  2. The patient will come back and see me in 2 weeks for cycle number 3.  3. We will continue Zofran as needed for nausea.  4. We will decrease decadron to 1 mg daily in AM.  Patient was advised not to take a higher dose since it may interfere with her treatment.  I asked the patient to take her Decadron every other day trying to wean herself off it completely.  4. We will continue colace daily.  5. I will continue to monitor patient blood work including blood counts kidney function and liver function and thyroid  function.  6. I will continue Ativan as needed for anxiety.  7. I will continue Norco 5/325 mg as needed for cancer related pain.  8.  I'll stop Bactrim at this point since patient is off Temodar and she is on a very low dose Decadron.    9.  The patient is is on metformin 1000 mg. her blood sugar are better.  10.  I will do a follow-up MRI brain after cycle #4.  11.  Patient was made aware about potential side effects of the treatment including bleeding, thrombosis, and immune mediated reactions.    Greg Montgomery MD  1/4/2017

## 2017-01-04 NOTE — LETTER
January 4, 2017     Dwight Wan MD  [address]  formerly [facility] [id number]    Patient: Billie Bello   YOB: 1960   Date of Visit: 1/4/2017       Dear Dr. Soco MD:    Billie Bello was in my office today. Below is a copy of my note.    If you have questions, please do not hesitate to call me. I look forward to following Billie along with you.         Sincerely,        Greg Montgomery MD        CC: MD Willis Talley MD    DATE OF VISIT: 1/4/2017    REASON FOR VISIT: Followup for GBM     HISTORY OF PRESENT ILLNESS: The patient is a very pleasant 56 y.o. female  who was in her usual state of health until approximately 1 month ago. The patient presented to [facility] emergency department with complaints of disorientation, headache, loss of peripheral vision, and confusion. This has been present over the past two to three weeks. She also noted she had a general feeling of poor health over the past two months and saw her primary care physician who diagnosed her with diabetes as well as hypertension. An MRI of the brain was ordered through the ER that showed a 2x3.7 cm ring-enhancing mass to the right occipital region with surrounding vasogenic edema. The patient underwent craniotomy with tumor debulking by Dr. Casas on 9/21/2016 with final pathology revealing glioblastoma multiforme. The patient had no post-operative complications. She was started on concurrent Temodar daily with brain irradiation on 10/17/2016.  This was completed November 26, 2016.  Follow-up MRI done on December 12, 2016 showed progressive disease.  Patient was started on second line treatment using Opdivo + Avastin on 12/21/2016.  The patient is here today for cycle number 2.    SUBJECTIVE: The patient has been doing really well.  Her double vision is gone, her headaches are gone as well.  She denied any bleeding. She has had no skin rash, (+)nausea, no vomiting, no progressive headaches.     PAST MEDICAL  HISTORY/SOCIAL HISTORY/FAMILY HISTORY: Unchanged from my prior documentation done on 10/07/2016    Review of Systems   Constitutional: Negative for activity change, appetite change, chills, fatigue, fever and unexpected weight change.   HENT: Negative for hearing loss, mouth sores, nosebleeds, sore throat and trouble swallowing.    Eyes: Negative for visual disturbance.   Respiratory: Negative for cough, chest tightness, shortness of breath and wheezing.    Cardiovascular: Negative for chest pain, palpitations and leg swelling.   Gastrointestinal: Positive for constipation. Negative for abdominal distention, abdominal pain, blood in stool, diarrhea, nausea, rectal pain and vomiting.   Endocrine: Negative for cold intolerance and heat intolerance.   Genitourinary: Negative for difficulty urinating, dysuria, frequency and urgency.   Musculoskeletal: Negative for arthralgias, back pain, gait problem, joint swelling and myalgias.   Skin: Negative for rash.   Neurological: Positive for headaches. Negative for dizziness, tremors, syncope, weakness, light-headedness and numbness.   Hematological: Negative for adenopathy. Does not bruise/bleed easily.   Psychiatric/Behavioral: Negative for confusion, sleep disturbance and suicidal ideas. The patient is not nervous/anxious.          Current Outpatient Prescriptions:   •  dexamethasone (DECADRON) 2 MG tablet, Take 0.5 tablets by mouth 2 (Two) Times a Day With Meals., Disp: 30 tablet, Rfl: 2  •  docusate sodium (COLACE) 100 MG capsule, Take 1 capsule by mouth 2 (Two) Times a Day. Two capusles, Disp: 60 capsule, Rfl: 3  •  glipiZIDE (GLUCOTROL) 5 MG ER tablet, take 1 tablet by mouth once daily, Disp: , Rfl: 0  •  metFORMIN (GLUCOPHAGE) 1000 MG tablet, Take 1,000 mg by mouth Daily., Disp: , Rfl: 0  •  ondansetron (ZOFRAN) 8 MG tablet, Take 1 tablet by mouth As Needed., Disp: , Rfl: 0    PHYSICAL EXAMINATION:   There were no vitals taken for this visit.   ECOG Performance Status:  1 - Symptomatic but completely ambulatory  General Appearance:  alert, cooperative, no apparent distress and appears stated age   Neurologic/Psychiatric: A&O x 3, gait steady, appropriate affect, strength 5/5 in all muscle groups   HEENT:  Normocephalic, without obvious abnormality, mucous membranes moist   Neck: Supple, symmetrical, trachea midline, no adenopathy;  No thyromegaly, masses, or tenderness   Lungs:   Clear to auscultation bilaterally; respirations regular, even, and unlabored bilaterally   Heart:  Regular rate and rhythm, no murmurs appreciated   Abdomen:   Soft, non-tender, non-distended and no organomegaly   Lymph nodes: No cervical, supraclavicular, inguinal or axillary adenopathy noted   Extremities: Normal, atraumatic; no clubbing, cyanosis, or edema    Skin: No rashes, ulcers, or suspicious lesions noted     Lab on 01/04/2017   Component Date Value Ref Range Status   • Color, UA 01/04/2017 Yellow  Yellow, Straw Final   • Appearance, UA 01/04/2017 Clear  Clear Final   • pH, UA 01/04/2017 5.5  5.0 - 8.0 Final   • Specific Gravity, UA 01/04/2017 1.020  1.005 - 1.030 Final   • Glucose, UA 01/04/2017 Negative  Negative Final   • Ketones, UA 01/04/2017 Negative  Negative Final   • Bilirubin, UA 01/04/2017 Negative  Negative Final   • Blood, UA 01/04/2017 Negative  Negative Final   • Protein, UA 01/04/2017 Negative  Negative Final   • Leuk Esterase, UA 01/04/2017 Negative  Negative Final   • Nitrite, UA 01/04/2017 Negative  Negative Final   • Urobilinogen, UA 01/04/2017 0.2 E.U./dL  0.2 - 1.0 E.U./dL Final   • WBC 01/04/2017 5.00  3.50 - 10.80 10*3/mm3 Final   • RBC 01/04/2017 4.21  3.89 - 5.14 10*6/mm3 Final   • Hemoglobin 01/04/2017 12.9  11.5 - 15.5 g/dL Final   • Hematocrit 01/04/2017 36.4  34.5 - 44.0 % Final   • MCV 01/04/2017 86.5  80.0 - 99.0 fL Final   • MCH 01/04/2017 30.6  27.0 - 31.0 pg Final   • MCHC 01/04/2017 35.4  32.0 - 36.0 g/dL Final   • RDW 01/04/2017 14.4  11.3 - 14.5 % Final   •  MPV 01/04/2017 9.3  6.0 - 12.0 fL Final   • Platelets 01/04/2017 168  150 - 450 10*3/mm3 Final   • Neutrophil % 01/04/2017 57.1  41.0 - 71.0 % Final   • Lymphocyte % 01/04/2017 30.3  24.0 - 44.0 % Final   • Auto Mixed Cells % 01/04/2017 12.6  0.0 - 16.0 % Final   • Neutrophils, Absolute 01/04/2017 2.90  1.50 - 8.30 10*3/mm3 Final   • Lymphocytes, Absolute 01/04/2017 1.50  0.60 - 4.80 10*3/mm3 Final   • Auto Mixed Cells # 01/04/2017 0.60  0.10 - 1.50 10*3/uL Final     Mri Stealth Brain W Wo Contrast    Result Date: 12/12/2016  Narrative: EXAMINATION:  MR DATA SETS OF THE BRAIN WITHOUT AND WITH CONTRAST-12/12/2016:  HISTORY: Followup glioblastoma, followup chemotherapy and radiation, restaging of brain malignancy.  TECHNIQUE: MR data sets of the brain were performed without and with intravenous contrast.  20 mL of MultiHance was administered for contrast enhancement.  FINDINGS: 1.  The diffusion weighted sequence demonstrates restricted diffusion in the area of previous resection of intra-axial rightward brain mass in the right posterior parietal and occipital region. ADC mapping exam demonstrates diffuse increased signal in this region.  2.  The foramen magnum and cervicomedullary junction remain clear and patent. The midline structures are unremarkable.  3.  FLAIR data sets demonstrate marked increased FLAIR signal in the splenium of the corpus callosum and diffusely throughout the right posterior parietal and occipital lobe with mass effect. T1 data sets demonstrate no evidence of acute hemorrhage. There is global mass effect and edema in the right hemisphere, however.  4.  Contrast enhanced data sets demonstrate intense abnormal perimeter ill defined enhancement in the lesion involving the right posterior temporal region, the peritrigonal white matter on the right, the posterior parietal region on the right and the occipital lobe extensively. This intensely enhancing lesion involves the central and rightward  aspect of the splenium of the corpus callosum and is surrounded by significant edema and mass effect.  Compared to previous examinations of 09/20/2016, there has been mild progression of the enhancing lesion in the right hemisphere into the posterior right temporal lobe and there are associated new enhancing nodules in the upper right parietal and occipital involvement. Therefore, there has been some progression of the enhancing lesion, although most of the lesion is otherwise stable. The involvement of the splenium of the corpus callosum now extends to and slightly past the midline which is a sign of further progression.      Impression: 1.  Slight progression of the intense enhancing ill defined mass lesion in the right hemisphere which now involves more of the right temporal lobe, more of the splenium of the central and rightward corpus callosum, and more nodular enhancing lesions in the upper right parietal and occipital region. Overall mass effect and edema has also slightly increased. 2.  Other findings as noted above.  D:  12/12/2016 E:  12/12/2016  This report was finalized on 12/12/2016 1:04 PM by Dr. Jerrod Cormier MD.    (    ASSESSMENT: The patient is a very pleasant 56 y.o. female with glioblastoma multiforme.     PROBLEM LIST:   1. Presented with confusion, visual change, and disorientation.   2. Right occipital mass found on MRI brain with surrounding vasogenic edema and mass effect  3. Status post craniotomy done by Dr. Casas on 9/21/2016  4. Final pathology shows glioblastoma multiforme.   5. Started concurrent Temodar with radiation 10/17/2016.  This was completed on November 26, 2016  6.  Progressive disease documented MRI brain done December 12, 2016.  7.  Started Opdivo with Avastin 12/21/2016. Status post 1 cycle.  8.  Type 2 diabetes     PLAN:   1.  I will proceed with treatment today using Opdivo plus Avastin q 2 weeks.  2. The patient will come back and see me in 2 weeks for cycle number  3.  3. We will continue Zofran as needed for nausea.  4. We will decrease decadron to 1 mg daily in AM.  Patient was advised not to take a higher dose since it may interfere with her treatment.  I asked the patient to take her Decadron every other day trying to wean herself off it completely.  4. We will continue colace daily.  5. I will continue to monitor patient blood work including blood counts kidney function and liver function and thyroid function.  6. I will continue Ativan as needed for anxiety.  7. I will continue Norco 5/325 mg as needed for cancer related pain.  8.  I'll stop Bactrim at this point since patient is off Temodar and she is on a very low dose Decadron.    9.  The patient is is on metformin 1000 mg. her blood sugar are better.  10.  I will do a follow-up MRI brain after cycle #4.  11.  Patient was made aware about potential side effects of the treatment including bleeding, thrombosis, and immune mediated reactions.    Greg Montgomery MD  1/4/2017

## 2017-01-04 NOTE — LETTER
January 4, 2017     Dwight Wan MD  [address]  [facility] [address]    Patient: Billie Bello   YOB: 1960   Date of Visit: 1/4/2017       Dear Dr. Soco MD:    Billie Bello was in my office today. Below is a copy of my note.    If you have questions, please do not hesitate to call me. I look forward to following Billie along with you.         Sincerely,        Greg Montgomery MD        CC: MD Willis Talley MD    DATE OF VISIT: 1/4/2017    REASON FOR VISIT: Followup for GBM     HISTORY OF PRESENT ILLNESS: The patient is a very pleasant 56 y.o. female  who was in her usual state of health until approximately 1 month ago. The patient presented to [facility] emergency department with complaints of disorientation, headache, loss of peripheral vision, and confusion. This has been present over the past two to three weeks. She also noted she had a general feeling of poor health over the past two months and saw her primary care physician who diagnosed her with diabetes as well as hypertension. An MRI of the brain was ordered through the ER that showed a 2x3.7 cm ring-enhancing mass to the right occipital region with surrounding vasogenic edema. The patient underwent craniotomy with tumor debulking by Dr. Casas on 9/21/2016 with final pathology revealing glioblastoma multiforme. The patient had no post-operative complications. She was started on concurrent Temodar daily with brain irradiation on 10/17/2016.  This was completed November 26, 2016.  Follow-up MRI done on December 12, 2016 showed progressive disease.  Patient was started on second line treatment using Opdivo + Avastin on 12/21/2016.  The patient is here today for cycle number 2.    SUBJECTIVE: The patient has been doing fairly well. She has been complaining of worsening vision.  Her bowels and bladder are working well. She has had no skin rash, (+)nausea, no vomiting, no progressive headaches.     PAST  MEDICAL HISTORY/SOCIAL HISTORY/FAMILY HISTORY: Unchanged from my prior documentation done on 10/07/2016    Review of Systems   Constitutional: Negative for activity change, appetite change, chills, fatigue, fever and unexpected weight change.   HENT: Negative for hearing loss, mouth sores, nosebleeds, sore throat and trouble swallowing.    Eyes: Negative for visual disturbance.   Respiratory: Negative for cough, chest tightness, shortness of breath and wheezing.    Cardiovascular: Negative for chest pain, palpitations and leg swelling.   Gastrointestinal: Positive for constipation. Negative for abdominal distention, abdominal pain, blood in stool, diarrhea, nausea, rectal pain and vomiting.   Endocrine: Negative for cold intolerance and heat intolerance.   Genitourinary: Negative for difficulty urinating, dysuria, frequency and urgency.   Musculoskeletal: Negative for arthralgias, back pain, gait problem, joint swelling and myalgias.   Skin: Negative for rash.   Neurological: Positive for headaches. Negative for dizziness, tremors, syncope, weakness, light-headedness and numbness.   Hematological: Negative for adenopathy. Does not bruise/bleed easily.   Psychiatric/Behavioral: Negative for confusion, sleep disturbance and suicidal ideas. The patient is not nervous/anxious.          Current Outpatient Prescriptions:   •  dexamethasone (DECADRON) 2 MG tablet, Take 0.5 tablets by mouth 2 (Two) Times a Day With Meals., Disp: 30 tablet, Rfl: 2  •  docusate sodium (COLACE) 100 MG capsule, Take 1 capsule by mouth 2 (Two) Times a Day. Two capusles, Disp: 60 capsule, Rfl: 3  •  glipiZIDE (GLUCOTROL) 5 MG ER tablet, take 1 tablet by mouth once daily, Disp: , Rfl: 0  •  metFORMIN (GLUCOPHAGE) 1000 MG tablet, Take 1,000 mg by mouth Daily., Disp: , Rfl: 0  •  ondansetron (ZOFRAN) 8 MG tablet, Take 1 tablet by mouth As Needed., Disp: , Rfl: 0    PHYSICAL EXAMINATION:   There were no vitals taken for this visit.   ECOG Performance  Status: 1 - Symptomatic but completely ambulatory  General Appearance:  alert, cooperative, no apparent distress and appears stated age   Neurologic/Psychiatric: A&O x 3, gait steady, appropriate affect, strength 5/5 in all muscle groups   HEENT:  Normocephalic, without obvious abnormality, mucous membranes moist   Neck: Supple, symmetrical, trachea midline, no adenopathy;  No thyromegaly, masses, or tenderness   Lungs:   Clear to auscultation bilaterally; respirations regular, even, and unlabored bilaterally   Heart:  Regular rate and rhythm, no murmurs appreciated   Abdomen:   Soft, non-tender, non-distended and no organomegaly   Lymph nodes: No cervical, supraclavicular, inguinal or axillary adenopathy noted   Extremities: Normal, atraumatic; no clubbing, cyanosis, or edema    Skin: No rashes, ulcers, or suspicious lesions noted     Lab on 01/04/2017   Component Date Value Ref Range Status   • Color, UA 01/04/2017 Yellow  Yellow, Straw Final   • Appearance, UA 01/04/2017 Clear  Clear Final   • pH, UA 01/04/2017 5.5  5.0 - 8.0 Final   • Specific Gravity, UA 01/04/2017 1.020  1.005 - 1.030 Final   • Glucose, UA 01/04/2017 Negative  Negative Final   • Ketones, UA 01/04/2017 Negative  Negative Final   • Bilirubin, UA 01/04/2017 Negative  Negative Final   • Blood, UA 01/04/2017 Negative  Negative Final   • Protein, UA 01/04/2017 Negative  Negative Final   • Leuk Esterase, UA 01/04/2017 Negative  Negative Final   • Nitrite, UA 01/04/2017 Negative  Negative Final   • Urobilinogen, UA 01/04/2017 0.2 E.U./dL  0.2 - 1.0 E.U./dL Final   • WBC 01/04/2017 5.00  3.50 - 10.80 10*3/mm3 Final   • RBC 01/04/2017 4.21  3.89 - 5.14 10*6/mm3 Final   • Hemoglobin 01/04/2017 12.9  11.5 - 15.5 g/dL Final   • Hematocrit 01/04/2017 36.4  34.5 - 44.0 % Final   • MCV 01/04/2017 86.5  80.0 - 99.0 fL Final   • MCH 01/04/2017 30.6  27.0 - 31.0 pg Final   • MCHC 01/04/2017 35.4  32.0 - 36.0 g/dL Final   • RDW 01/04/2017 14.4  11.3 - 14.5 %  Final   • MPV 01/04/2017 9.3  6.0 - 12.0 fL Final   • Platelets 01/04/2017 168  150 - 450 10*3/mm3 Final   • Neutrophil % 01/04/2017 57.1  41.0 - 71.0 % Final   • Lymphocyte % 01/04/2017 30.3  24.0 - 44.0 % Final   • Auto Mixed Cells % 01/04/2017 12.6  0.0 - 16.0 % Final   • Neutrophils, Absolute 01/04/2017 2.90  1.50 - 8.30 10*3/mm3 Final   • Lymphocytes, Absolute 01/04/2017 1.50  0.60 - 4.80 10*3/mm3 Final   • Auto Mixed Cells # 01/04/2017 0.60  0.10 - 1.50 10*3/uL Final     Mri Stealth Brain W Wo Contrast    Result Date: 12/12/2016  Narrative: EXAMINATION:  MR DATA SETS OF THE BRAIN WITHOUT AND WITH CONTRAST-12/12/2016:  HISTORY: Followup glioblastoma, followup chemotherapy and radiation, restaging of brain malignancy.  TECHNIQUE: MR data sets of the brain were performed without and with intravenous contrast.  20 mL of MultiHance was administered for contrast enhancement.  FINDINGS: 1.  The diffusion weighted sequence demonstrates restricted diffusion in the area of previous resection of intra-axial rightward brain mass in the right posterior parietal and occipital region. ADC mapping exam demonstrates diffuse increased signal in this region.  2.  The foramen magnum and cervicomedullary junction remain clear and patent. The midline structures are unremarkable.  3.  FLAIR data sets demonstrate marked increased FLAIR signal in the splenium of the corpus callosum and diffusely throughout the right posterior parietal and occipital lobe with mass effect. T1 data sets demonstrate no evidence of acute hemorrhage. There is global mass effect and edema in the right hemisphere, however.  4.  Contrast enhanced data sets demonstrate intense abnormal perimeter ill defined enhancement in the lesion involving the right posterior temporal region, the peritrigonal white matter on the right, the posterior parietal region on the right and the occipital lobe extensively. This intensely enhancing lesion involves the central and  rightward aspect of the splenium of the corpus callosum and is surrounded by significant edema and mass effect.  Compared to previous examinations of 09/20/2016, there has been mild progression of the enhancing lesion in the right hemisphere into the posterior right temporal lobe and there are associated new enhancing nodules in the upper right parietal and occipital involvement. Therefore, there has been some progression of the enhancing lesion, although most of the lesion is otherwise stable. The involvement of the splenium of the corpus callosum now extends to and slightly past the midline which is a sign of further progression.      Impression: 1.  Slight progression of the intense enhancing ill defined mass lesion in the right hemisphere which now involves more of the right temporal lobe, more of the splenium of the central and rightward corpus callosum, and more nodular enhancing lesions in the upper right parietal and occipital region. Overall mass effect and edema has also slightly increased. 2.  Other findings as noted above.  D:  12/12/2016 E:  12/12/2016  This report was finalized on 12/12/2016 1:04 PM by Dr. Jerrod Cormier MD.    (    ASSESSMENT: The patient is a very pleasant 56 y.o. female with glioblastoma multiforme.     PROBLEM LIST:   1. Presented with confusion, visual change, and disorientation.   2. Right occipital mass found on MRI brain with surrounding vasogenic edema and mass effect  3. Status post craniotomy done by Dr. Casas on 9/21/2016  4. Final pathology shows glioblastoma multiforme.   5. Started concurrent Temodar with radiation 10/17/2016.  This was completed on November 26, 2016  6.  Progressive disease documented MRI brain done December 12, 2016.  7.  Started Opdivo with Avastin 12/21/2016. Status post 1 cycle.  8.  Type 2 diabetes     PLAN:   1.  I will proceed with treatment today using Opdivo plus Avastin q 2 weeks.  2. The patient will come back and see me in 2 weeks for cycle  number 3.  3. We will continue Zofran as needed for nausea.  4. We will decrease decadron to 1 mg daily in AM.  Patient was advised to take a higher dose since it may interfere with her treatment.  4. We will continue colace daily.  5. I will continue to monitor patient blood work including blood counts kidney function and liver function and thyroid function.  6. I will continue Ativan as needed for anxiety.  7. I will continue Norco 5/325 mg as needed for cancer related pain.  8.  I'll stop Bactrim at this point since patient is off Temodar and she is on a very low dose Decadron.    9.  The patient is is on metformin 1000 mg. her blood sugar are better.  10.  I will do a follow-up MRI brain after cycle #4.  11.  Patient was made aware about potential side effects of the treatment including bleeding, thrombosis, and immune mediated reactions.    Greg Montgomery MD  1/4/2017

## 2017-01-12 NOTE — PROGRESS NOTES
FOLLOW UP NOTE    PATIENT:                                                      Billie Bello  MEDICAL RECORD #:                        [id number]  :                                                          1960  COMPLETION DATE:   2016  DIAGNOSIS:     Glioblatoma Multiforme      BRIEF HISTORY:   Billie Bello is a very pleasant 56-year-old female recently diagnosed with glioblastoma multiforme. She developed headaches, vision changes, confusion, disorientation gait ataxia and generalized sense of maliaise. An MRI demonstrated a 5 x 3 x 5 cm irregular mass in the right parieto-occipital region with surrounding vasogenic edema. A stealth MRI described the lesion as 2 x 3.7 cm with edema and mass effect. She underwent craniotomy by Dr. Casas on 16 for tumor debulking.She received 60 Gy to the tumor completing 16.      No Known Allergies    MEDICATIONS: Medication reconciliation for the patient was reviewed and confirmed in the electronic medical record.    Review of Systems   Constitutional: Negative.    HENT:  Negative.    Eyes: Negative.    Respiratory: Negative.    Cardiovascular: Negative.    Gastrointestinal: Negative.    Endocrine: Negative.    Genitourinary: Negative.     Musculoskeletal: Negative.    Skin: Negative for wound.   Neurological: Negative.    Hematological: Negative.    Psychiatric/Behavioral: Negative.        KPS 90%    Physical Exam   Constitutional: She is oriented to person, place, and time. She appears well-developed and well-nourished. No distress.   HENT:   Head: Normocephalic and atraumatic.   Eyes: EOM are normal. No scleral icterus.   Neck: Neck supple.   Cardiovascular: Normal rate, regular rhythm and normal heart sounds.  Exam reveals no gallop and no friction rub.    No murmur heard.  Pulmonary/Chest: Effort normal and breath sounds normal.   Abdominal: She exhibits no distension. There is no tenderness.   Lymphadenopathy:     She has no cervical  "adenopathy.   Neurological: She is alert and oriented to person, place, and time. No cranial nerve deficit (muscle strength +5/5 equal and symmetric).   Skin: Skin is warm and dry.   Nursing note and vitals reviewed.      VITAL SIGNS:   Vitals:    01/12/17 1137   BP: 135/63   Pulse: 82   Resp: 18   Temp: 98.1 °F (36.7 °C)   Weight: 228 lb (103 kg)   Height: 66\" (167.6 cm)   PainSc: 0-No pain       The following portions of the patient's history were reviewed and updated as appropriate: allergies, current medications, past family history, past medical history, past social history, past surgical history and problem list.         Glioblastoma multiforme [C71.9]    IMPRESSION: no acute side effects of radiation    RECOMMENDATIONS:  Taking dexamethasone 1 mg daily.  Can reduce to 1/2 tablet daily.  She continues chemotherapy of avastin and opdivo and feeling well.  She'll see us as needed since she is seeing  in [place].       Return for PRN.     Reina Garcia MD    Errors in dictation may reflect use of voice recognition software and not all errors in transcription may have been detected prior to signing.  "

## 2017-01-18 NOTE — PROGRESS NOTES
DATE OF VISIT: 1/18/2017    REASON FOR VISIT: Followup for GBM     HISTORY OF PRESENT ILLNESS: The patient is a very pleasant 56 y.o. female  who was in her usual state of health until approximately 1 month ago. The patient presented to [facility] emergency department with complaints of disorientation, headache, loss of peripheral vision, and confusion. This has been present over the past two to three weeks. She also noted she had a general feeling of poor health over the past two months and saw her primary care physician who diagnosed her with diabetes as well as hypertension. An MRI of the brain was ordered through the ER that showed a 2x3.7 cm ring-enhancing mass to the right occipital region with surrounding vasogenic edema. The patient underwent craniotomy with tumor debulking by Dr. Casas on 9/21/2016 with final pathology revealing glioblastoma multiforme. The patient had no post-operative complications. She was started on concurrent Temodar daily with brain irradiation on 10/17/2016.  This was completed November 26, 2016.  Follow-up MRI done on December 12, 2016 showed progressive disease.  Patient was started on second line treatment using Opdivo + Avastin on 12/21/2016.  The patient is here today for cycle number 3.    SUBJECTIVE: The patient has been doing really well.  Her double vision is gone, her headaches are gone as well.  She denied any bleeding. She has had no skin rash, (+)nausea, no vomiting, no progressive headaches.     PAST MEDICAL HISTORY/SOCIAL HISTORY/FAMILY HISTORY: Unchanged from my prior documentation done on 10/07/2016    Review of Systems   Constitutional: Negative for activity change, appetite change, chills, fatigue, fever and unexpected weight change.   HENT: Negative for hearing loss, mouth sores, nosebleeds, sore throat and trouble swallowing.    Eyes: Negative for visual disturbance.   Respiratory: Negative for cough, chest tightness, shortness of breath and wheezing.     Cardiovascular: Negative for chest pain, palpitations and leg swelling.   Gastrointestinal: Positive for constipation. Negative for abdominal distention, abdominal pain, blood in stool, diarrhea, nausea, rectal pain and vomiting.   Endocrine: Negative for cold intolerance and heat intolerance.   Genitourinary: Negative for difficulty urinating, dysuria, frequency and urgency.   Musculoskeletal: Negative for arthralgias, back pain, gait problem, joint swelling and myalgias.   Skin: Negative for rash.   Neurological: Positive for headaches. Negative for dizziness, tremors, syncope, weakness, light-headedness and numbness.   Hematological: Negative for adenopathy. Does not bruise/bleed easily.   Psychiatric/Behavioral: Negative for confusion, sleep disturbance and suicidal ideas. The patient is not nervous/anxious.          Current Outpatient Prescriptions:   •  dexamethasone (DECADRON) 2 MG tablet, Take 0.5 tablets by mouth 2 (Two) Times a Day With Meals. (Patient taking differently: Take 1 mg by mouth Daily.), Disp: 30 tablet, Rfl: 2  •  glipiZIDE (GLUCOTROL) 5 MG ER tablet, take 1 tablet by mouth once daily, Disp: , Rfl: 0  •  metFORMIN (GLUCOPHAGE) 1000 MG tablet, Take 1,000 mg by mouth Daily., Disp: , Rfl: 0    PHYSICAL EXAMINATION:   Visit Vitals   • /72   • Pulse 73   • Temp 97.8 °F (36.6 °C) (Temporal Artery )   • Resp 18   • Wt 228 lb (103 kg)   • BMI 36.8 kg/m2      ECOG Performance Status: 1 - Symptomatic but completely ambulatory  General Appearance:  alert, cooperative, no apparent distress and appears stated age   Neurologic/Psychiatric: A&O x 3, gait steady, appropriate affect, strength 5/5 in all muscle groups   HEENT:  Normocephalic, without obvious abnormality, mucous membranes moist   Neck: Supple, symmetrical, trachea midline, no adenopathy;  No thyromegaly, masses, or tenderness   Lungs:   Clear to auscultation bilaterally; respirations regular, even, and unlabored bilaterally   Heart:   Regular rate and rhythm, no murmurs appreciated   Abdomen:   Soft, non-tender, non-distended and no organomegaly   Lymph nodes: No cervical, supraclavicular, inguinal or axillary adenopathy noted   Extremities: Normal, atraumatic; no clubbing, cyanosis, or edema    Skin: No rashes, ulcers, or suspicious lesions noted     No visits with results within 2 Week(s) from this visit.  Latest known visit with results is:    Infusion on 01/04/2017   Component Date Value Ref Range Status   • Creatinine 01/04/2017 0.8  mg/dL Preliminary   • Creatinine 01/04/2017 0.80  0.60 - 1.30 mg/dL Final    Serial Number: [id number]    : [id number]     No results found.(    ASSESSMENT: The patient is a very pleasant 56 y.o. female with glioblastoma multiforme.     PROBLEM LIST:   1. Presented with confusion, visual change, and disorientation.   2. Right occipital mass found on MRI brain with surrounding vasogenic edema and mass effect  3. Status post craniotomy done by Dr. Casas on 9/21/2016  4. Final pathology shows glioblastoma multiforme.   5. Started concurrent Temodar with radiation 10/17/2016.  This was completed on November 26, 2016  6.  Progressive disease documented MRI brain done December 12, 2016.  7.  Started Opdivo with Avastin 12/21/2016. Status post 2 cycle.  8.  Type 2 diabetes     PLAN:   1.  I will proceed with treatment today using Opdivo plus Avastin q 2 weeks.  2. The patient will come back and see me in 2 weeks for cycle number 4.  3. We will continue Zofran as needed for nausea.  4. We will decrease decadron to 1 mg daily in AM.  Patient was advised not to take a higher dose since it may interfere with her treatment.  I asked the patient to take her Decadron every other day trying to wean herself off it completely.  4. We will continue colace daily.  5. I will continue to monitor patient blood work including blood counts kidney function and liver function and thyroid function.  6. I will continue Ativan as needed  for anxiety.  7. I will continue Norco 5/325 mg as needed for cancer related pain.  8.  I'll stop Bactrim at this point since patient is off Temodar and she is on a very low dose Decadron.    9.  The patient is is on metformin 1000 mg. her blood sugar are better.  10.  I will do a follow-up MRI brain after cycle #4.  11.  Patient was made aware about potential side effects of the treatment including bleeding, thrombosis, and immune mediated reactions.    Greg Montgomery MD  1/18/2017

## 2017-01-18 NOTE — LETTER
January 18, 2017     Dwight Wan MD  [address]  [facility] [address]    Patient: Billie Bello   YOB: 1960   Date of Visit: 1/18/2017       Dear Dr. Soco MD:    Billie Bello was in my office today. Below is a copy of my note.    If you have questions, please do not hesitate to call me. I look forward to following Billie along with you.         Sincerely,        Greg Montgomery MD        CC: MD Willis Talley MD    DATE OF VISIT: 1/18/2017    REASON FOR VISIT: Followup for GBM     HISTORY OF PRESENT ILLNESS: The patient is a very pleasant 56 y.o. female  who was in her usual state of health until approximately 1 month ago. The patient presented to [facility] emergency department with complaints of disorientation, headache, loss of peripheral vision, and confusion. This has been present over the past two to three weeks. She also noted she had a general feeling of poor health over the past two months and saw her primary care physician who diagnosed her with diabetes as well as hypertension. An MRI of the brain was ordered through the ER that showed a 2x3.7 cm ring-enhancing mass to the right occipital region with surrounding vasogenic edema. The patient underwent craniotomy with tumor debulking by Dr. Casas on 9/21/2016 with final pathology revealing glioblastoma multiforme. The patient had no post-operative complications. She was started on concurrent Temodar daily with brain irradiation on 10/17/2016.  This was completed November 26, 2016.  Follow-up MRI done on December 12, 2016 showed progressive disease.  Patient was started on second line treatment using Opdivo + Avastin on 12/21/2016.  The patient is here today for cycle number 3.    SUBJECTIVE: The patient has been doing really well.  Her double vision is gone, her headaches are gone as well.  She denied any bleeding. She has had no skin rash, (+)nausea, no vomiting, no progressive headaches.     PAST  MEDICAL HISTORY/SOCIAL HISTORY/FAMILY HISTORY: Unchanged from my prior documentation done on 10/07/2016    Review of Systems   Constitutional: Negative for activity change, appetite change, chills, fatigue, fever and unexpected weight change.   HENT: Negative for hearing loss, mouth sores, nosebleeds, sore throat and trouble swallowing.    Eyes: Negative for visual disturbance.   Respiratory: Negative for cough, chest tightness, shortness of breath and wheezing.    Cardiovascular: Negative for chest pain, palpitations and leg swelling.   Gastrointestinal: Positive for constipation. Negative for abdominal distention, abdominal pain, blood in stool, diarrhea, nausea, rectal pain and vomiting.   Endocrine: Negative for cold intolerance and heat intolerance.   Genitourinary: Negative for difficulty urinating, dysuria, frequency and urgency.   Musculoskeletal: Negative for arthralgias, back pain, gait problem, joint swelling and myalgias.   Skin: Negative for rash.   Neurological: Positive for headaches. Negative for dizziness, tremors, syncope, weakness, light-headedness and numbness.   Hematological: Negative for adenopathy. Does not bruise/bleed easily.   Psychiatric/Behavioral: Negative for confusion, sleep disturbance and suicidal ideas. The patient is not nervous/anxious.          Current Outpatient Prescriptions:   •  dexamethasone (DECADRON) 2 MG tablet, Take 0.5 tablets by mouth 2 (Two) Times a Day With Meals. (Patient taking differently: Take 1 mg by mouth Daily.), Disp: 30 tablet, Rfl: 2  •  glipiZIDE (GLUCOTROL) 5 MG ER tablet, take 1 tablet by mouth once daily, Disp: , Rfl: 0  •  metFORMIN (GLUCOPHAGE) 1000 MG tablet, Take 1,000 mg by mouth Daily., Disp: , Rfl: 0    PHYSICAL EXAMINATION:   Visit Vitals   • /72   • Pulse 73   • Temp 97.8 °F (36.6 °C) (Temporal Artery )   • Resp 18   • Wt 228 lb (103 kg)   • BMI 36.8 kg/m2      ECOG Performance Status: 1 - Symptomatic but completely ambulatory  General  Appearance:  alert, cooperative, no apparent distress and appears stated age   Neurologic/Psychiatric: A&O x 3, gait steady, appropriate affect, strength 5/5 in all muscle groups   HEENT:  Normocephalic, without obvious abnormality, mucous membranes moist   Neck: Supple, symmetrical, trachea midline, no adenopathy;  No thyromegaly, masses, or tenderness   Lungs:   Clear to auscultation bilaterally; respirations regular, even, and unlabored bilaterally   Heart:  Regular rate and rhythm, no murmurs appreciated   Abdomen:   Soft, non-tender, non-distended and no organomegaly   Lymph nodes: No cervical, supraclavicular, inguinal or axillary adenopathy noted   Extremities: Normal, atraumatic; no clubbing, cyanosis, or edema    Skin: No rashes, ulcers, or suspicious lesions noted     No visits with results within 2 Week(s) from this visit.  Latest known visit with results is:    Infusion on 01/04/2017   Component Date Value Ref Range Status   • Creatinine 01/04/2017 0.8  mg/dL Preliminary   • Creatinine 01/04/2017 0.80  0.60 - 1.30 mg/dL Final    Serial Number: [id number]    : [id number]     No results found.(    ASSESSMENT: The patient is a very pleasant 56 y.o. female with glioblastoma multiforme.     PROBLEM LIST:   1. Presented with confusion, visual change, and disorientation.   2. Right occipital mass found on MRI brain with surrounding vasogenic edema and mass effect  3. Status post craniotomy done by Dr. Casas on 9/21/2016  4. Final pathology shows glioblastoma multiforme.   5. Started concurrent Temodar with radiation 10/17/2016.  This was completed on November 26, 2016  6.  Progressive disease documented MRI brain done December 12, 2016.  7.  Started Opdivo with Avastin 12/21/2016. Status post 2 cycle.  8.  Type 2 diabetes     PLAN:   1.  I will proceed with treatment today using Opdivo plus Avastin q 2 weeks.  2. The patient will come back and see me in 2 weeks for cycle number 4.  3. We will continue Zofran  as needed for nausea.  4. We will decrease decadron to 1 mg daily in AM.  Patient was advised not to take a higher dose since it may interfere with her treatment.  I asked the patient to take her Decadron every other day trying to wean herself off it completely.  4. We will continue colace daily.  5. I will continue to monitor patient blood work including blood counts kidney function and liver function and thyroid function.  6. I will continue Ativan as needed for anxiety.  7. I will continue Norco 5/325 mg as needed for cancer related pain.  8.  I'll stop Bactrim at this point since patient is off Temodar and she is on a very low dose Decadron.    9.  The patient is is on metformin 1000 mg. her blood sugar are better.  10.  I will do a follow-up MRI brain after cycle #4.  11.  Patient was made aware about potential side effects of the treatment including bleeding, thrombosis, and immune mediated reactions.    Greg Montgomery MD  1/18/2017

## 2017-02-01 NOTE — PROGRESS NOTES
DATE OF VISIT: 2/1/2017    REASON FOR VISIT: Followup for GBM     HISTORY OF PRESENT ILLNESS: The patient is a very pleasant 56 y.o. female  who was in her usual state of health until approximately 1 month ago. The patient presented to [facility] emergency department with complaints of disorientation, headache, loss of peripheral vision, and confusion. This has been present over the past two to three weeks. She also noted she had a general feeling of poor health over the past two months and saw her primary care physician who diagnosed her with diabetes as well as hypertension. An MRI of the brain was ordered through the ER that showed a 2x3.7 cm ring-enhancing mass to the right occipital region with surrounding vasogenic edema. The patient underwent craniotomy with tumor debulking by Dr. Casas on 9/21/2016 with final pathology revealing glioblastoma multiforme. The patient had no post-operative complications. She was started on concurrent Temodar daily with brain irradiation on 10/17/2016.  This was completed November 26, 2016.  Follow-up MRI done on December 12, 2016 showed progressive disease.  Patient was started on second line treatment using Opdivo + Avastin on 12/21/2016.  The patient is here today for cycle number 4.    SUBJECTIVE: The patient has been doing really well.  Her double vision is gone, her headaches are gone as well.  She denied any bleeding. She has had no skin rash, (+)nausea, no vomiting, no progressive headaches.     PAST MEDICAL HISTORY/SOCIAL HISTORY/FAMILY HISTORY: Unchanged from my prior documentation done on 10/07/2016    Review of Systems   Constitutional: Negative for activity change, appetite change, chills, fatigue, fever and unexpected weight change.   HENT: Negative for hearing loss, mouth sores, nosebleeds, sore throat and trouble swallowing.    Eyes: Negative for visual disturbance.   Respiratory: Negative for cough, chest tightness, shortness of breath and wheezing.     Cardiovascular: Negative for chest pain, palpitations and leg swelling.   Gastrointestinal: Positive for constipation. Negative for abdominal distention, abdominal pain, blood in stool, diarrhea, nausea, rectal pain and vomiting.   Endocrine: Negative for cold intolerance and heat intolerance.   Genitourinary: Negative for difficulty urinating, dysuria, frequency and urgency.   Musculoskeletal: Negative for arthralgias, back pain, gait problem, joint swelling and myalgias.   Skin: Negative for rash.   Neurological: Positive for headaches. Negative for dizziness, tremors, syncope, weakness, light-headedness and numbness.   Hematological: Negative for adenopathy. Does not bruise/bleed easily.   Psychiatric/Behavioral: Negative for confusion, sleep disturbance and suicidal ideas. The patient is not nervous/anxious.          Current Outpatient Prescriptions:   •  dexamethasone (DECADRON) 2 MG tablet, Take 0.5 tablets by mouth 2 (Two) Times a Day With Meals. (Patient taking differently: Take 2 mg by mouth Daily. 1x daily), Disp: 30 tablet, Rfl: 2  •  glipiZIDE (GLUCOTROL) 5 MG ER tablet, take 1 tablet by mouth once daily, Disp: , Rfl: 0  •  metFORMIN (GLUCOPHAGE) 1000 MG tablet, Take 1,000 mg by mouth Daily., Disp: , Rfl: 0    PHYSICAL EXAMINATION:   Visit Vitals   • /76   • Pulse 83   • Temp 97.8 °F (36.6 °C) (Temporal Artery )   • Resp 16   • Wt 225 lb (102 kg)   • BMI 36.32 kg/m2      ECOG Performance Status: 1 - Symptomatic but completely ambulatory  General Appearance:  alert, cooperative, no apparent distress and appears stated age   Neurologic/Psychiatric: A&O x 3, gait steady, appropriate affect, strength 5/5 in all muscle groups   HEENT:  Normocephalic, without obvious abnormality, mucous membranes moist   Neck: Supple, symmetrical, trachea midline, no adenopathy;  No thyromegaly, masses, or tenderness   Lungs:   Clear to auscultation bilaterally; respirations regular, even, and unlabored bilaterally    Heart:  Regular rate and rhythm, no murmurs appreciated   Abdomen:   Soft, non-tender, non-distended and no organomegaly   Lymph nodes: No cervical, supraclavicular, inguinal or axillary adenopathy noted   Extremities: Normal, atraumatic; no clubbing, cyanosis, or edema    Skin: No rashes, ulcers, or suspicious lesions noted     No visits with results within 2 Week(s) from this visit.  Latest known visit with results is:    Infusion on 01/04/2017   Component Date Value Ref Range Status   • Creatinine 01/04/2017 0.8  mg/dL Preliminary   • Creatinine 01/04/2017 0.80  0.60 - 1.30 mg/dL Final    Serial Number: [id number]    : [id number]     No results found.(    ASSESSMENT: The patient is a very pleasant 56 y.o. female with glioblastoma multiforme.     PROBLEM LIST:   1. Presented with confusion, visual change, and disorientation.   2. Right occipital mass found on MRI brain with surrounding vasogenic edema and mass effect  3. Status post craniotomy done by Dr. Casas on 9/21/2016  4. Final pathology shows glioblastoma multiforme.   5. Started concurrent Temodar with radiation 10/17/2016.  This was completed on November 26, 2016  6.  Progressive disease documented MRI brain done December 12, 2016.  7.  Started Opdivo with Avastin 12/21/2016. Status post 3 cycle.  8.  Type 2 diabetes     PLAN:   1.  I will proceed with treatment today using Opdivo plus Avastin q 2 weeks.  2. The patient will come back and see me in 2 weeks for cycle number 5.  3. We will continue Zofran as needed for nausea.  4. We will increase decadron to 2 mg daily in AM.  Patient was advised not to take a higher dose since it may interfere with her treatment.  The patient could not tolerate a lower dose.  4. We will continue colace daily.  5. I will continue to monitor patient blood work including blood counts kidney function and liver function and thyroid function.  6. I will continue Ativan as needed for anxiety.  7. I will continue Norco 5/325  mg as needed for cancer related pain.  8.  I'll stop Bactrim at this point since patient is off Temodar and she is on a very low dose Decadron.    9.  The patient is is on metformin 1000 mg. her blood sugar are better.  10.  I will do a follow-up MRI brain after cycle #4. I will order it prior to return.   11.  Patient was made aware about potential side effects of the treatment including bleeding, thrombosis, and immune mediated reactions.    Greg Montgomery MD  2/1/2017

## 2017-02-15 NOTE — PROGRESS NOTES
DATE OF VISIT: 2/15/2017    REASON FOR VISIT: Followup for GBM     HISTORY OF PRESENT ILLNESS: The patient is a very pleasant 56 y.o. female  who was in her usual state of health until approximately 1 month ago. The patient presented to [facility] emergency department with complaints of disorientation, headache, loss of peripheral vision, and confusion. This has been present over the past two to three weeks. She also noted she had a general feeling of poor health over the past two months and saw her primary care physician who diagnosed her with diabetes as well as hypertension. An MRI of the brain was ordered through the ER that showed a 2x3.7 cm ring-enhancing mass to the right occipital region with surrounding vasogenic edema. The patient underwent craniotomy with tumor debulking by Dr. Casas on 9/21/2016 with final pathology revealing glioblastoma multiforme. The patient had no post-operative complications. She was started on concurrent Temodar daily with brain irradiation on 10/17/2016.  This was completed November 26, 2016.  Follow-up MRI done on December 12, 2016 showed progressive disease.  Patient was started on second line treatment using Opdivo + Avastin on 12/21/2016.  The patient is here today for cycle number 5.    SUBJECTIVE: The patient has been doing really well.  Her double vision is gone, her headaches are gone as well.  She denied any bleeding. She has had no skin rash, (+)nausea, no vomiting, no progressive headaches.     PAST MEDICAL HISTORY/SOCIAL HISTORY/FAMILY HISTORY: Unchanged from my prior documentation done on 10/07/2016    Review of Systems   Constitutional: Negative for activity change, appetite change, chills, fatigue, fever and unexpected weight change.   HENT: Negative for hearing loss, mouth sores, nosebleeds, sore throat and trouble swallowing.    Eyes: Negative for visual disturbance.   Respiratory: Negative for cough, chest tightness, shortness of breath and wheezing.     Cardiovascular: Negative for chest pain, palpitations and leg swelling.   Gastrointestinal: Positive for constipation. Negative for abdominal distention, abdominal pain, blood in stool, diarrhea, nausea, rectal pain and vomiting.   Endocrine: Negative for cold intolerance and heat intolerance.   Genitourinary: Negative for difficulty urinating, dysuria, frequency and urgency.   Musculoskeletal: Negative for arthralgias, back pain, gait problem, joint swelling and myalgias.   Skin: Negative for rash.   Neurological: Positive for headaches. Negative for dizziness, tremors, syncope, weakness, light-headedness and numbness.   Hematological: Negative for adenopathy. Does not bruise/bleed easily.   Psychiatric/Behavioral: Negative for confusion, sleep disturbance and suicidal ideas. The patient is not nervous/anxious.          Current Outpatient Prescriptions:   •  dexamethasone (DECADRON) 2 MG tablet, Take 0.5 tablets by mouth 2 (Two) Times a Day With Meals. (Patient taking differently: Take 2 mg by mouth Daily. 1x daily), Disp: 30 tablet, Rfl: 2  •  glipiZIDE (GLUCOTROL) 5 MG ER tablet, take 1 tablet by mouth once daily, Disp: , Rfl: 0  •  metFORMIN (GLUCOPHAGE) 1000 MG tablet, Take 1,000 mg by mouth Daily., Disp: , Rfl: 0    PHYSICAL EXAMINATION:   Visit Vitals   • BP (!) 182/84   • Pulse 72   • Temp 97.6 °F (36.4 °C) (Temporal Artery )   • Resp 16   • Wt 226 lb (103 kg)   • BMI 36.48 kg/m2      ECOG Performance Status: 1 - Symptomatic but completely ambulatory  General Appearance:  alert, cooperative, no apparent distress and appears stated age   Neurologic/Psychiatric: A&O x 3, gait steady, appropriate affect, strength 5/5 in all muscle groups   HEENT:  Normocephalic, without obvious abnormality, mucous membranes moist   Neck: Supple, symmetrical, trachea midline, no adenopathy;  No thyromegaly, masses, or tenderness   Lungs:   Clear to auscultation bilaterally; respirations regular, even, and unlabored bilaterally    Heart:  Regular rate and rhythm, no murmurs appreciated   Abdomen:   Soft, non-tender, non-distended and no organomegaly   Lymph nodes: No cervical, supraclavicular, inguinal or axillary adenopathy noted   Extremities: Normal, atraumatic; no clubbing, cyanosis, or edema    Skin: No rashes, ulcers, or suspicious lesions noted     Hospital Outpatient Visit on 02/13/2017   Component Date Value Ref Range Status   • Creatinine 02/13/2017 0.90  0.60 - 1.30 mg/dL Final    Serial Number: [id number]    : [id number]     Mri Brain With & Without Contrast    Result Date: 2/14/2017  Narrative: EXAMINATION: MRI BRAIN WITH AND WITHOUT CONTRAST-02/13/2017:  INDICATION: F/U; C71.9-Malignant neoplasm of brain, unspecified, history of glioblastoma multiforme.  TECHNIQUE: Multiplanar, multi-weighted MRI of the brain before and after intravenous contrast.  COMPARISON: 12/12/2016.  FINDINGS:  There is mild diffusion restriction in the right peritrigonal white matter and splenium of the corpus callosum; not significantly changed since 12/12/2016. The midline structures are central. A resection cavity is present in the right occipital lobe extending to the level of the splenic corpus callosum. FLAIR sequences reveal mild T2 signal hyperintensity surrounding the tract and splenium of the corpus callosum; markedly improved since 12/12/2016. The lateral ventricles are grossly unremarkable. The globes and orbits are normal. There is mucosal thickening in the right maxillary sinus. The mastoid air cells are normal. The corpus callosum demonstrates some hyperintensity along its posterior aspect. The pituitary gland and foramen magnum are normal.  After contrast, there is nodular enhancement around the right ventricular trigone; markedly decreased with respect to signal intensity since 12/12/2016. The overall area of enhancing tissue has also decreased. A small nodule is present along the medial aspect of the tract just underlying the bony  calvarium.      Impression: 1.  Marked interval decrease in vasogenic edema of the right occipital lobe. 2.  Persistent areas of restricted diffusion and enhancement throughout the resection tract and splenium of the corpus callosum suggesting possible disease residual. 3.  Continued surveillance is recommended.  D:  02/13/2017 E:  02/13/2017  This report was finalized on 2/14/2017 7:37 AM by Dr. Tom Conte MD.    (    ASSESSMENT: The patient is a very pleasant 56 y.o. female with glioblastoma multiforme.     PROBLEM LIST:   1. Presented with confusion, visual change, and disorientation.   2. Right occipital mass found on MRI brain with surrounding vasogenic edema and mass effect  3. Status post craniotomy done by Dr. Casas on 9/21/2016  4. Final pathology shows glioblastoma multiforme.   5. Started concurrent Temodar with radiation 10/17/2016.  This was completed on November 26, 2016  6.  Progressive disease documented MRI brain done December 12, 2016.  7.  Started Opdivo with Avastin 12/21/2016. Status post 4 cycle.  8.  Type 2 diabetes     PLAN:   1.  I will proceed with treatment today using Opdivo plus Avastin q 2 weeks.  2. The patient will come back and see me in 2 weeks for cycle number 6.  3. We will continue Zofran as needed for nausea.  4. We will continue decadron to 2 mg daily in AM.  Patient was advised not to take a higher dose since it may interfere with her treatment.  The patient could not tolerate a lower dose.  4. We will continue colace daily.  5. I will continue to monitor patient blood work including blood counts kidney function and liver function and thyroid function.  6. I will continue Ativan as needed for anxiety.  7. I will continue Norco 5/325 mg as needed for cancer related pain.  8.  I'll stop Bactrim at this point since patient is off Temodar and she is on a very low dose Decadron.    9.  The patient is is on metformin 1000 mg. her blood sugar are better.  10.  I did go over the MRI  results I reviewed the films myself went over the pictures with the patient her sister and 2 daughters I reassured them she is having a great response to treatment.  I will do a follow-up MRI brain after cycle #8.   11.  Patient was made aware about potential side effects of the treatment including bleeding, thrombosis, and immune mediated reactions.    Greg Montgomery MD  2/15/2017

## 2017-02-27 NOTE — TELEPHONE ENCOUNTER
Pt advised that this is an okay range for now, much better than in the past. Advised that Dr Montgomery will see her on Wednesday prior to treatment, and can discuss making changes then if needed.

## 2017-02-27 NOTE — TELEPHONE ENCOUNTER
----- Message from Tonya Sosa sent at 2/27/2017 10:46 AM EST -----  Regarding: BADIN-BP  Contact: [phone]  Patient's sister Quiana warner has a couple questions about her BP and if she can still get treatment with /83 today 131/85 yesterday this is with her being on BP medication? Please advise.

## 2017-03-01 NOTE — PROGRESS NOTES
DATE OF VISIT: 3/1/2017    REASON FOR VISIT: Followup for GBM     HISTORY OF PRESENT ILLNESS: The patient is a very pleasant 56 y.o. female  who was in her usual state of health until approximately 1 month ago. The patient presented to [facility] emergency department with complaints of disorientation, headache, loss of peripheral vision, and confusion. This has been present over the past two to three weeks. She also noted she had a general feeling of poor health over the past two months and saw her primary care physician who diagnosed her with diabetes as well as hypertension. An MRI of the brain was ordered through the ER that showed a 2x3.7 cm ring-enhancing mass to the right occipital region with surrounding vasogenic edema. The patient underwent craniotomy with tumor debulking by Dr. Casas on 9/21/2016 with final pathology revealing glioblastoma multiforme. The patient had no post-operative complications. She was started on concurrent Temodar daily with brain irradiation on 10/17/2016.  This was completed November 26, 2016.  Follow-up MRI done on December 12, 2016 showed progressive disease.  Patient was started on second line treatment using Opdivo + Avastin on 12/21/2016.  The patient is here today for cycle number 6.    SUBJECTIVE: The patient has been doing really well.  Her double vision is gone, her headaches are gone as well.  She denied any bleeding. She has had no skin rash, (+)nausea, no vomiting, no progressive headaches.     PAST MEDICAL HISTORY/SOCIAL HISTORY/FAMILY HISTORY: Unchanged from my prior documentation done on 10/07/2016    Review of Systems   Constitutional: Negative for activity change, appetite change, chills, fatigue, fever and unexpected weight change.   HENT: Negative for hearing loss, mouth sores, nosebleeds, sore throat and trouble swallowing.    Eyes: Negative for visual disturbance.   Respiratory: Negative for cough, chest tightness, shortness of breath and wheezing.     Cardiovascular: Negative for chest pain, palpitations and leg swelling.   Gastrointestinal: Positive for constipation. Negative for abdominal distention, abdominal pain, blood in stool, diarrhea, nausea, rectal pain and vomiting.   Endocrine: Negative for cold intolerance and heat intolerance.   Genitourinary: Negative for difficulty urinating, dysuria, frequency and urgency.   Musculoskeletal: Negative for arthralgias, back pain, gait problem, joint swelling and myalgias.   Skin: Negative for rash.   Neurological: Positive for headaches. Negative for dizziness, tremors, syncope, weakness, light-headedness and numbness.   Hematological: Negative for adenopathy. Does not bruise/bleed easily.   Psychiatric/Behavioral: Negative for confusion, sleep disturbance and suicidal ideas. The patient is not nervous/anxious.          Current Outpatient Prescriptions:   •  dexamethasone (DECADRON) 2 MG tablet, Take 0.5 tablets by mouth 2 (Two) Times a Day With Meals. (Patient taking differently: Take 2 mg by mouth Daily. 1x daily), Disp: 30 tablet, Rfl: 2  •  glipiZIDE (GLUCOTROL) 5 MG ER tablet, take 1 tablet by mouth once daily, Disp: , Rfl: 0  •  metFORMIN (GLUCOPHAGE) 1000 MG tablet, Take 1,000 mg by mouth Daily., Disp: , Rfl: 0    PHYSICAL EXAMINATION:   There were no vitals taken for this visit.   ECOG Performance Status: 1 - Symptomatic but completely ambulatory  General Appearance:  alert, cooperative, no apparent distress and appears stated age   Neurologic/Psychiatric: A&O x 3, gait steady, appropriate affect, strength 5/5 in all muscle groups   HEENT:  Normocephalic, without obvious abnormality, mucous membranes moist   Neck: Supple, symmetrical, trachea midline, no adenopathy;  No thyromegaly, masses, or tenderness   Lungs:   Clear to auscultation bilaterally; respirations regular, even, and unlabored bilaterally   Heart:  Regular rate and rhythm, no murmurs appreciated   Abdomen:   Soft, non-tender, non-distended  and no organomegaly   Lymph nodes: No cervical, supraclavicular, inguinal or axillary adenopathy noted   Extremities: Normal, atraumatic; no clubbing, cyanosis, or edema    Skin: No rashes, ulcers, or suspicious lesions noted     No visits with results within 2 Week(s) from this visit.  Latest known visit with results is:    Hospital Outpatient Visit on 02/13/2017   Component Date Value Ref Range Status   • Creatinine 02/13/2017 0.90  0.60 - 1.30 mg/dL Final    Serial Number: [id number]    : [id number]     Mri Brain With & Without Contrast    Result Date: 2/14/2017  Narrative: EXAMINATION: MRI BRAIN WITH AND WITHOUT CONTRAST-02/13/2017:  INDICATION: F/U; C71.9-Malignant neoplasm of brain, unspecified, history of glioblastoma multiforme.  TECHNIQUE: Multiplanar, multi-weighted MRI of the brain before and after intravenous contrast.  COMPARISON: 12/12/2016.  FINDINGS:  There is mild diffusion restriction in the right peritrigonal white matter and splenium of the corpus callosum; not significantly changed since 12/12/2016. The midline structures are central. A resection cavity is present in the right occipital lobe extending to the level of the splenic corpus callosum. FLAIR sequences reveal mild T2 signal hyperintensity surrounding the tract and splenium of the corpus callosum; markedly improved since 12/12/2016. The lateral ventricles are grossly unremarkable. The globes and orbits are normal. There is mucosal thickening in the right maxillary sinus. The mastoid air cells are normal. The corpus callosum demonstrates some hyperintensity along its posterior aspect. The pituitary gland and foramen magnum are normal.  After contrast, there is nodular enhancement around the right ventricular trigone; markedly decreased with respect to signal intensity since 12/12/2016. The overall area of enhancing tissue has also decreased. A small nodule is present along the medial aspect of the tract just underlying the bony calvarium.       Impression: 1.  Marked interval decrease in vasogenic edema of the right occipital lobe. 2.  Persistent areas of restricted diffusion and enhancement throughout the resection tract and splenium of the corpus callosum suggesting possible disease residual. 3.  Continued surveillance is recommended.  D:  02/13/2017 E:  02/13/2017  This report was finalized on 2/14/2017 7:37 AM by Dr. Tom Conte MD.    (    ASSESSMENT: The patient is a very pleasant 56 y.o. female with glioblastoma multiforme.     PROBLEM LIST:   1. Presented with confusion, visual change, and disorientation.   2. Right occipital mass found on MRI brain with surrounding vasogenic edema and mass effect  3. Status post craniotomy done by Dr. Casas on 9/21/2016  4. Final pathology shows glioblastoma multiforme.   5. Started concurrent Temodar with radiation 10/17/2016.  This was completed on November 26, 2016  6.  Progressive disease documented MRI brain done December 12, 2016.  7.  Started Opdivo with Avastin 12/21/2016. Status post 5 cycle.  8.  Type 2 diabetes     PLAN:   1.  I will proceed with treatment today using Opdivo plus Avastin q 2 weeks.  2. The patient will come back and see me in 2 weeks for cycle number 7.  3. We will continue Zofran as needed for nausea.  4. We will continue decadron to 2 mg daily in AM.  Patient was advised not to take a higher dose since it may interfere with her treatment.  The patient could not tolerate a lower dose.  4. We will continue colace daily.  5. I will continue to monitor patient blood work including blood counts kidney function and liver function and thyroid function.  6. I will continue Ativan as needed for anxiety.  7. I will continue Norco 5/325 mg as needed for cancer related pain.  8.  I'll stop Bactrim at this point since patient is off Temodar and she is on a very low dose Decadron.    9.  The patient is is on metformin 1000 mg. her blood sugar are better.  10.  I did go over the MRI results I  reviewed the films myself went over the pictures with the patient her sister and 2 daughters I reassured them she is having a great response to treatment.  I will do a follow-up MRI brain after cycle #8.   11.  Patient was made aware about potential side effects of the treatment including bleeding, thrombosis, and immune mediated reactions.    Greg Montgomery MD  3/1/2017

## 2017-03-15 NOTE — PROGRESS NOTES
DATE OF VISIT: 3/15/2017    REASON FOR VISIT: Followup for GBM     HISTORY OF PRESENT ILLNESS: The patient is a very pleasant 56 y.o. female  who was in her usual state of health until approximately 1 month ago. The patient presented to [facility] emergency department with complaints of disorientation, headache, loss of peripheral vision, and confusion. This has been present over the past two to three weeks. She also noted she had a general feeling of poor health over the past two months and saw her primary care physician who diagnosed her with diabetes as well as hypertension. An MRI of the brain was ordered through the ER that showed a 2x3.7 cm ring-enhancing mass to the right occipital region with surrounding vasogenic edema. The patient underwent craniotomy with tumor debulking by Dr. Casas on 9/21/2016 with final pathology revealing glioblastoma multiforme. The patient had no post-operative complications. She was started on concurrent Temodar daily with brain irradiation on 10/17/2016.  This was completed November 26, 2016.  Follow-up MRI done on December 12, 2016 showed progressive disease.  Patient was started on second line treatment using Opdivo + Avastin on 12/21/2016.  The patient is here today for cycle number 7.    SUBJECTIVE: The patient has been doing really well.  Her double vision is gone, her headaches are gone as well.  She denied any bleeding. She has had no skin rash, (+)nausea, no vomiting, no progressive headaches.     PAST MEDICAL HISTORY/SOCIAL HISTORY/FAMILY HISTORY: Unchanged from my prior documentation done on 10/07/2016    Review of Systems   Constitutional: Negative for activity change, appetite change, chills, fatigue, fever and unexpected weight change.   HENT: Negative for hearing loss, mouth sores, nosebleeds, sore throat and trouble swallowing.    Eyes: Negative for visual disturbance.   Respiratory: Negative for cough, chest tightness, shortness of breath and wheezing.     Cardiovascular: Negative for chest pain, palpitations and leg swelling.   Gastrointestinal: Positive for constipation. Negative for abdominal distention, abdominal pain, blood in stool, diarrhea, nausea, rectal pain and vomiting.   Endocrine: Negative for cold intolerance and heat intolerance.   Genitourinary: Negative for difficulty urinating, dysuria, frequency and urgency.   Musculoskeletal: Negative for arthralgias, back pain, gait problem, joint swelling and myalgias.   Skin: Negative for rash.   Neurological: Positive for headaches. Negative for dizziness, tremors, syncope, weakness, light-headedness and numbness.   Hematological: Negative for adenopathy. Does not bruise/bleed easily.   Psychiatric/Behavioral: Negative for confusion, sleep disturbance and suicidal ideas. The patient is not nervous/anxious.          Current Outpatient Prescriptions:   •  dexamethasone (DECADRON) 2 MG tablet, Take 0.5 tablets by mouth 2 (Two) Times a Day With Meals. (Patient taking differently: Take 1 mg by mouth Daily. 1x daily), Disp: 30 tablet, Rfl: 2  •  glipiZIDE (GLUCOTROL) 5 MG ER tablet, take 1 tablet by mouth once daily, Disp: , Rfl: 0  •  lisinopril (PRINIVIL,ZESTRIL) 40 MG tablet, Take 40 mg by mouth Daily., Disp: , Rfl:   •  metFORMIN (GLUCOPHAGE) 1000 MG tablet, Take 1,000 mg by mouth Daily., Disp: , Rfl: 0    PHYSICAL EXAMINATION:   Visit Vitals   • /87   • Pulse 87   • Temp 97.6 °F (36.4 °C) (Temporal Artery )   • Resp 19   • Wt 224 lb (102 kg)   • BMI 36.15 kg/m2      ECOG Performance Status: 1 - Symptomatic but completely ambulatory  General Appearance:  alert, cooperative, no apparent distress and appears stated age   Neurologic/Psychiatric: A&O x 3, gait steady, appropriate affect, strength 5/5 in all muscle groups   HEENT:  Normocephalic, without obvious abnormality, mucous membranes moist   Neck: Supple, symmetrical, trachea midline, no adenopathy;  No thyromegaly, masses, or tenderness   Lungs:    Clear to auscultation bilaterally; respirations regular, even, and unlabored bilaterally   Heart:  Regular rate and rhythm, no murmurs appreciated   Abdomen:   Soft, non-tender, non-distended and no organomegaly   Lymph nodes: No cervical, supraclavicular, inguinal or axillary adenopathy noted   Extremities: Normal, atraumatic; no clubbing, cyanosis, or edema    Skin: No rashes, ulcers, or suspicious lesions noted     No visits with results within 2 Week(s) from this visit.  Latest known visit with results is:    Hospital Outpatient Visit on 02/13/2017   Component Date Value Ref Range Status   • Creatinine 02/13/2017 0.90  0.60 - 1.30 mg/dL Final    Serial Number: [id number]    : [id number]     Mri Brain With & Without Contrast    Result Date: 2/14/2017  Narrative: EXAMINATION: MRI BRAIN WITH AND WITHOUT CONTRAST-02/13/2017:  INDICATION: F/U; C71.9-Malignant neoplasm of brain, unspecified, history of glioblastoma multiforme.  TECHNIQUE: Multiplanar, multi-weighted MRI of the brain before and after intravenous contrast.  COMPARISON: 12/12/2016.  FINDINGS:  There is mild diffusion restriction in the right peritrigonal white matter and splenium of the corpus callosum; not significantly changed since 12/12/2016. The midline structures are central. A resection cavity is present in the right occipital lobe extending to the level of the splenic corpus callosum. FLAIR sequences reveal mild T2 signal hyperintensity surrounding the tract and splenium of the corpus callosum; markedly improved since 12/12/2016. The lateral ventricles are grossly unremarkable. The globes and orbits are normal. There is mucosal thickening in the right maxillary sinus. The mastoid air cells are normal. The corpus callosum demonstrates some hyperintensity along its posterior aspect. The pituitary gland and foramen magnum are normal.  After contrast, there is nodular enhancement around the right ventricular trigone; markedly decreased with respect  to signal intensity since 12/12/2016. The overall area of enhancing tissue has also decreased. A small nodule is present along the medial aspect of the tract just underlying the bony calvarium.      Impression: 1.  Marked interval decrease in vasogenic edema of the right occipital lobe. 2.  Persistent areas of restricted diffusion and enhancement throughout the resection tract and splenium of the corpus callosum suggesting possible disease residual. 3.  Continued surveillance is recommended.  D:  02/13/2017 E:  02/13/2017  This report was finalized on 2/14/2017 7:37 AM by Dr. Tom Conte MD.    (    ASSESSMENT: The patient is a very pleasant 56 y.o. female with glioblastoma multiforme.     PROBLEM LIST:   1. Presented with confusion, visual change, and disorientation.   2. Right occipital mass found on MRI brain with surrounding vasogenic edema and mass effect  3. Status post craniotomy done by Dr. Casas on 9/21/2016  4. Final pathology shows glioblastoma multiforme.   5. Started concurrent Temodar with radiation 10/17/2016.  This was completed on November 26, 2016  6.  Progressive disease documented MRI brain done December 12, 2016.  7.  Started Opdivo with Avastin 12/21/2016. Status post 6 cycle.  8.  Type 2 diabetes  9. Slightly elevated TSH     PLAN:   1.  I will proceed with treatment today using Opdivo plus Avastin q 2 weeks.  2. The patient will come back and see me in 2 weeks for cycle number 8.  3. We will continue Zofran as needed for nausea.  4. We will continue decadron to 2 mg daily in AM.  Patient was advised not to take a higher dose since it may interfere with her treatment.  The patient could not tolerate a lower dose.  4. We will continue colace daily.  5. I will continue to monitor patient blood work including blood counts kidney function and liver function and thyroid function.  6. I will continue Ativan as needed for anxiety.  7. I will continue Norco 5/325 mg as needed for cancer related  pain.  8.  I'll stop Bactrim at this point since patient is off Temodar and she is on a very low dose Decadron.    9.  The patient is is on metformin 1000 mg. her blood sugar are better.  10.  I did go over the MRI results I reviewed the films myself went over the pictures with the patient her sister and 2 daughters I reassured them she is having a great response to treatment.  I will do a follow-up MRI brain after cycle #8.   11.  Patient was made aware about potential side effects of the treatment including bleeding, thrombosis, and immune mediated reactions.  12. I will consider starting the patient on Synthroid if needed.    Greg Montgomery MD  3/15/2017

## 2017-03-29 NOTE — PROGRESS NOTES
DATE OF VISIT: 3/29/2017    REASON FOR VISIT: Followup for GBM     HISTORY OF PRESENT ILLNESS: The patient is a very pleasant 56 y.o. female  who was in her usual state of health until approximately 1 month ago. The patient presented to [facility] emergency department with complaints of disorientation, headache, loss of peripheral vision, and confusion. This has been present over the past two to three weeks. She also noted she had a general feeling of poor health over the past two months and saw her primary care physician who diagnosed her with diabetes as well as hypertension. An MRI of the brain was ordered through the ER that showed a 2x3.7 cm ring-enhancing mass to the right occipital region with surrounding vasogenic edema. The patient underwent craniotomy with tumor debulking by Dr. Casas on 9/21/2016 with final pathology revealing glioblastoma multiforme. The patient had no post-operative complications. She was started on concurrent Temodar daily with brain irradiation on 10/17/2016.  This was completed November 26, 2016.  Follow-up MRI done on December 12, 2016 showed progressive disease.  Patient was started on second line treatment using Opdivo + Avastin on 12/21/2016.  The patient is here today for cycle number 8.    SUBJECTIVE: The patient has been doing really well.  Her double vision is gone, her headaches are gone as well.  She denied any bleeding. She has had no skin rash, (+)nausea, no vomiting, no progressive headaches.     PAST MEDICAL HISTORY/SOCIAL HISTORY/FAMILY HISTORY: Unchanged from my prior documentation done on 10/07/2016    Review of Systems   Constitutional: Negative for activity change, appetite change, chills, fatigue, fever and unexpected weight change.   HENT: Negative for hearing loss, mouth sores, nosebleeds, sore throat and trouble swallowing.    Eyes: Negative for visual disturbance.   Respiratory: Negative for cough, chest tightness, shortness of breath and wheezing.     Cardiovascular: Negative for chest pain, palpitations and leg swelling.   Gastrointestinal: Positive for constipation. Negative for abdominal distention, abdominal pain, blood in stool, diarrhea, nausea, rectal pain and vomiting.   Endocrine: Negative for cold intolerance and heat intolerance.   Genitourinary: Negative for difficulty urinating, dysuria, frequency and urgency.   Musculoskeletal: Negative for arthralgias, back pain, gait problem, joint swelling and myalgias.   Skin: Negative for rash.   Neurological: Positive for headaches. Negative for dizziness, tremors, syncope, weakness, light-headedness and numbness.   Hematological: Negative for adenopathy. Does not bruise/bleed easily.   Psychiatric/Behavioral: Negative for confusion, sleep disturbance and suicidal ideas. The patient is not nervous/anxious.          Current Outpatient Prescriptions:   •  dexamethasone (DECADRON) 1 MG tablet, Take 1 tablet by mouth Daily With Breakfast., Disp: 30 tablet, Rfl: 2  •  glipiZIDE (GLUCOTROL) 5 MG ER tablet, take 1 tablet by mouth once daily, Disp: , Rfl: 0  •  hydrochlorothiazide (HYDRODIURIL) 25 MG tablet, , Disp: , Rfl:   •  lisinopril (PRINIVIL,ZESTRIL) 40 MG tablet, Take 40 mg by mouth Daily., Disp: , Rfl:   •  metFORMIN (GLUCOPHAGE) 1000 MG tablet, Take 1,000 mg by mouth Daily., Disp: , Rfl: 0    PHYSICAL EXAMINATION:   /70  Pulse 91  Temp 97.1 °F (36.2 °C) (Temporal Artery )   Resp 18  Wt 219 lb (99.3 kg)  BMI 35.35 kg/m2   ECOG Performance Status: 1 - Symptomatic but completely ambulatory  General Appearance:  alert, cooperative, no apparent distress and appears stated age   Neurologic/Psychiatric: A&O x 3, gait steady, appropriate affect, strength 5/5 in all muscle groups   HEENT:  Normocephalic, without obvious abnormality, mucous membranes moist   Neck: Supple, symmetrical, trachea midline, no adenopathy;  No thyromegaly, masses, or tenderness   Lungs:   Clear to auscultation bilaterally;  respirations regular, even, and unlabored bilaterally   Heart:  Regular rate and rhythm, no murmurs appreciated   Abdomen:   Soft, non-tender, non-distended and no organomegaly   Lymph nodes: No cervical, supraclavicular, inguinal or axillary adenopathy noted   Extremities: Normal, atraumatic; no clubbing, cyanosis, or edema    Skin: No rashes, ulcers, or suspicious lesions noted     No visits with results within 2 Week(s) from this visit.  Latest known visit with results is:    Hospital Outpatient Visit on 02/13/2017   Component Date Value Ref Range Status   • Creatinine 02/13/2017 0.90  0.60 - 1.30 mg/dL Final    Serial Number: [id number]    : [id number]     No results found.(    ASSESSMENT: The patient is a very pleasant 56 y.o. female with glioblastoma multiforme.     PROBLEM LIST:   1. Presented with confusion, visual change, and disorientation.   2. Right occipital mass found on MRI brain with surrounding vasogenic edema and mass effect  3. Status post craniotomy done by Dr. Casas on 9/21/2016  4. Final pathology shows glioblastoma multiforme.   5. Started concurrent Temodar with radiation 10/17/2016.  This was completed on November 26, 2016  6.  Progressive disease documented MRI brain done December 12, 2016.  7.  Started Opdivo with Avastin 12/21/2016. Status post 7 cycle.  8.  Type 2 diabetes  9. Slightly elevated TSH  10.  Fatigue     PLAN:   1.  I will proceed with treatment today using Opdivo plus Avastin q 2 weeks.  2. The patient will come back and see me in 2 weeks for cycle number 9.  3. We will continue Zofran as needed for nausea.  4. We will continue decadron to 2 mg daily in AM.  Patient was advised not to take a higher dose since it may interfere with her treatment.  The patient could not tolerate a lower dose.  4. We will continue colace daily.  5. I will continue to monitor patient blood work including blood counts kidney function and liver function and thyroid function.  6. I will continue  Ativan as needed for anxiety.  7. I will continue Norco 5/325 mg as needed for cancer related pain.  8.  I'll stop Bactrim at this point since patient is off Temodar and she is on a very low dose Decadron.    9.  The patient is is on metformin 1000 mg. her blood sugar are better.  10.  I did go over the MRI results I reviewed the films myself went over the pictures with the patient her sister and 2 daughters I reassured them she is having a great response to treatment.  I will do a follow-up MRI brain after cycle #9.   11.  Patient was made aware about potential side effects of the treatment including bleeding, thrombosis, and immune mediated reactions.  12. I will consider starting the patient on Synthroid if needed.  13.  Patient might be a candidate for available clinical trial looking at patients with GBM and associated fatigue.  She rated her fatigue score 6 out of 10.  I will contact my .    Greg Montgomery MD  3/29/2017

## 2017-04-03 NOTE — TELEPHONE ENCOUNTER
----- Message from Ashley Phillips sent at 4/3/2017  1:16 PM EDT -----  Regarding: BADIN - MEDICATION QUESTION   Contact: [phone]  Quiana, sister of patient called saying Billie has been feeling really bad. She went to her PCP and she has a kidney infection, and they prescribed  Septa  800MG (2 times a day) and they want to make sure this ok to take during chemo. Is there anything else she needs to do?

## 2017-04-05 PROBLEM — E86.0 DEHYDRATION: Status: ACTIVE | Noted: 2017-01-01

## 2017-04-12 NOTE — PROGRESS NOTES
07-Jan-2017 11:30 DATE OF VISIT: 4/12/2017    REASON FOR VISIT: Followup for GBM     HISTORY OF PRESENT ILLNESS: The patient is a very pleasant 56 y.o. female  who was in her usual state of health until approximately 1 month ago. The patient presented to [facility] emergency department with complaints of disorientation, headache, loss of peripheral vision, and confusion. This has been present over the past two to three weeks. She also noted she had a general feeling of poor health over the past two months and saw her primary care physician who diagnosed her with diabetes as well as hypertension. An MRI of the brain was ordered through the ER that showed a 2x3.7 cm ring-enhancing mass to the right occipital region with surrounding vasogenic edema. The patient underwent craniotomy with tumor debulking by Dr. Casas on 9/21/2016 with final pathology revealing glioblastoma multiforme. The patient had no post-operative complications. She was started on concurrent Temodar daily with brain irradiation on 10/17/2016.  This was completed November 26, 2016.  Follow-up MRI done on December 12, 2016 showed progressive disease.  Patient was started on second line treatment using Opdivo + Avastin on 12/21/2016.  The patient is here today for cycle number 9.    SUBJECTIVE: The patient has been doing really well.  Her double vision is gone, her headaches are gone as well.  She denied any bleeding. She has had no skin rash, (+)nausea, no vomiting, no progressive headaches.  She denies any more burning with urination, she completed oral antibiotic for UTI.  She is complaining of pain with swallowing and dry mouth    PAST MEDICAL HISTORY/SOCIAL HISTORY/FAMILY HISTORY: Unchanged from my prior documentation done on 10/07/2016    Review of Systems   Constitutional: Negative for activity change, appetite change, chills, fatigue, fever and unexpected weight change.   HENT: Negative for hearing loss, mouth sores, nosebleeds, sore throat and trouble  swallowing.    Eyes: Negative for visual disturbance.   Respiratory: Negative for cough, chest tightness, shortness of breath and wheezing.    Cardiovascular: Negative for chest pain, palpitations and leg swelling.   Gastrointestinal: Positive for constipation. Negative for abdominal distention, abdominal pain, blood in stool, diarrhea, nausea, rectal pain and vomiting.   Endocrine: Negative for cold intolerance and heat intolerance.   Genitourinary: Negative for difficulty urinating, dysuria, frequency and urgency.   Musculoskeletal: Negative for arthralgias, back pain, gait problem, joint swelling and myalgias.   Skin: Negative for rash.   Neurological: Positive for headaches. Negative for dizziness, tremors, syncope, weakness, light-headedness and numbness.   Hematological: Negative for adenopathy. Does not bruise/bleed easily.   Psychiatric/Behavioral: Negative for confusion, sleep disturbance and suicidal ideas. The patient is not nervous/anxious.          Current Outpatient Prescriptions:   •  dexamethasone (DECADRON) 1 MG tablet, Take 1 tablet by mouth Daily With Breakfast., Disp: 30 tablet, Rfl: 2  •  glipiZIDE (GLUCOTROL) 5 MG ER tablet, take 1 tablet by mouth once daily, Disp: , Rfl: 0  •  lisinopril (PRINIVIL,ZESTRIL) 40 MG tablet, Take 40 mg by mouth Daily., Disp: , Rfl:   •  metFORMIN (GLUCOPHAGE) 1000 MG tablet, Take 1,000 mg by mouth Daily., Disp: , Rfl: 0    PHYSICAL EXAMINATION:   /85  Pulse 80  Temp 98 °F (36.7 °C) (Temporal Artery )   Resp 15  Wt 216 lb (98 kg)  BMI 34.86 kg/m2   ECOG Performance Status: 1 - Symptomatic but completely ambulatory  General Appearance:  alert, cooperative, no apparent distress and appears stated age   Neurologic/Psychiatric: A&O x 3, gait steady, appropriate affect, strength 5/5 in all muscle groups   HEENT:  Normocephalic, without obvious abnormality, mucous membranes moist   Neck: Supple, symmetrical, trachea midline, no adenopathy;  No thyromegaly,  masses, or tenderness   Lungs:   Clear to auscultation bilaterally; respirations regular, even, and unlabored bilaterally   Heart:  Regular rate and rhythm, no murmurs appreciated   Abdomen:   Soft, non-tender, non-distended and no organomegaly   Lymph nodes: No cervical, supraclavicular, inguinal or axillary adenopathy noted   Extremities: Normal, atraumatic; no clubbing, cyanosis, or edema    Skin: No rashes, ulcers, or suspicious lesions noted     No visits with results within 2 Week(s) from this visit.  Latest known visit with results is:    Hospital Outpatient Visit on 02/13/2017   Component Date Value Ref Range Status   • Creatinine 02/13/2017 0.90  0.60 - 1.30 mg/dL Final    Serial Number: [id number]    : [id number]     Mri Brain With & Without Contrast    Result Date: 4/11/2017  Narrative: EXAMINATION: MRI BRAIN W WO CONTRAST-  INDICATION: Followup scan; C71.9-Malignant neoplasm of brain, unspecified.  TECHNIQUE: MR datasets of the brain were performed without and with intravenous contrast.  COMPARISON: Compared to previous MR datasets without and with contrast 02/13/2017.  FINDINGS: 1. There is a serpiginous enhancing linear or tubular abnormality or defect in the right posterior parietal and occipital region. It appears to abut the peritrigonal region of the right lateral ventricular system.  The configuration, size and perimeter enhancement of this abnormality is stable. Recurrent or progressive soft tissue component is not identified.  2. The diffusion-weighted sequence demonstrates restricted diffusion around the rightward splenium of the corpus callosum and the peritrigonal region of the right lateral ventricular system. These areas exhibit low signal on the ADC mapping exam. This pattern, although abnormal, is stable from previous exams.  3. The foramen magnum is clear. There is tonsillar ectopia with mild crowding of the cervicomedullary junction. There is congestion of the maxillary sinuses with  mucosal edema. T1 datasets are negative for hemorrhage or extracerebral collection.  4. Lastly, the contrast enhanced datasets reveal no evidence of new enhancement as described above and no new enhancement lesion is identified remotely.      Impression: 1. Stable apparent postoperative changes noted in the right posterior parietal and occipital region. Recurrent tumor or increasing enhancement is not identified. 2. Stable restricted diffusion with abnormal ADC mapping signal is seen within this right occipital and corpus callosal abnormality, worrisome but not progressed from previous exams. 3. New enhancing lesion elsewhere is not identified. Therefore the abnormalities in the right posterior parietal and occipital areas appear to be essentially stable from previous examinations of 02/13/2017. The superior sagittal sinus and dural venous sinuses are patent.   D:  04/10/2017 E:  04/11/2017  This report was finalized on 4/11/2017 9:37 AM by Dr. Jerrod Cormier MD.    (    ASSESSMENT: The patient is a very pleasant 56 y.o. female with glioblastoma multiforme.     PROBLEM LIST:   1. Presented with confusion, visual change, and disorientation.   2. Right occipital mass found on MRI brain with surrounding vasogenic edema and mass effect  3. Status post craniotomy done by Dr. Casas on 9/21/2016  4. Final pathology shows glioblastoma multiforme.   5. Started concurrent Temodar with radiation 10/17/2016.  This was completed on November 26, 2016  6.  Progressive disease documented MRI brain done December 12, 2016.  7.  Started Opdivo with Avastin 12/21/2016. Status post 8 cycle.  8.  Type 2 diabetes  9. Slightly elevated TSH  10.  Fatigue, enrolled in [place] [id number] clinical trial protocol     PLAN:   1.  I will proceed with treatment today using Opdivo plus Avastin q 2 weeks.  2. The patient will come back and see me in 2 weeks for cycle number 10.  3. We will continue Zofran as needed for nausea.  4. We will continue  decadron to 2 mg daily in AM.  Patient was advised not to take a higher dose since it may interfere with her treatment.  The patient could not tolerate a lower dose.  4. We will continue colace daily.  5. I will continue to monitor patient blood work including blood counts kidney function and liver function and thyroid function.  6. I will continue Ativan as needed for anxiety.  7. I will continue Norco 5/325 mg as needed for cancer related pain.  8.  I'll stop Bactrim at this point since patient is off Temodar and she is on a very low dose Decadron.    9.  The patient is is on metformin 1000 mg. her blood sugar are better.  10.  I did go over the MRI results I reviewed the films myself went over the pictures with the patient her sister and 2 daughters I reassured them she is having a great response to treatment.  I will do a follow-up MRI brain in 3 months, this will be due July 10, 2017.  11.  Patient was made aware about potential side effects of the treatment including bleeding, thrombosis, and immune mediated reactions.  12. I will consider starting the patient on Synthroid if needed.  13.  Patient was consented today for [facility] clinical trial looking at fatigue in GBM patient's.  Patient score fatigue was 6 out of 10.      Greg Montgomery MD  4/12/2017   07-Jan-2017 11:31

## 2017-04-26 NOTE — PROGRESS NOTES
DATE OF VISIT: 4/26/2017    REASON FOR VISIT: Followup for GBM     HISTORY OF PRESENT ILLNESS: The patient is a very pleasant 56 y.o. female  who was in her usual state of health until approximately 1 month ago. The patient presented to [facility] emergency department with complaints of disorientation, headache, loss of peripheral vision, and confusion. This has been present over the past two to three weeks. She also noted she had a general feeling of poor health over the past two months and saw her primary care physician who diagnosed her with diabetes as well as hypertension. An MRI of the brain was ordered through the ER that showed a 2x3.7 cm ring-enhancing mass to the right occipital region with surrounding vasogenic edema. The patient underwent craniotomy with tumor debulking by Dr. Casas on 9/21/2016 with final pathology revealing glioblastoma multiforme. The patient had no post-operative complications. She was started on concurrent Temodar daily with brain irradiation on 10/17/2016.  This was completed November 26, 2016.  Follow-up MRI done on December 12, 2016 showed progressive disease.  Patient was started on second line treatment using Opdivo + Avastin on 12/21/2016.  The patient is here today for cycle number 10.    SUBJECTIVE: The patient has been doing really well.  Her double vision is gone, her headaches are gone as well.  She denied any bleeding. She has had no skin rash, (+)nausea, no vomiting, no progressive headaches.  Patient started to have vaginal bleeding over the last 2 days.  She did not have a menstrual cycle for more than 20 years.  This is associated with abdominal cramps and low back pain.    PAST MEDICAL HISTORY/SOCIAL HISTORY/FAMILY HISTORY: Unchanged from my prior documentation done on 10/07/2016    Review of Systems   Constitutional: Negative for activity change, appetite change, chills, fatigue, fever and unexpected weight change.   HENT: Negative for hearing loss, mouth  sores, nosebleeds, sore throat and trouble swallowing.    Eyes: Negative for visual disturbance.   Respiratory: Negative for cough, chest tightness, shortness of breath and wheezing.    Cardiovascular: Negative for chest pain, palpitations and leg swelling.   Gastrointestinal: Positive for constipation. Negative for abdominal distention, abdominal pain, blood in stool, diarrhea, nausea, rectal pain and vomiting.   Endocrine: Negative for cold intolerance and heat intolerance.   Genitourinary: Negative for difficulty urinating, dysuria, frequency and urgency.   Musculoskeletal: Negative for arthralgias, back pain, gait problem, joint swelling and myalgias.   Skin: Negative for rash.   Neurological: Positive for headaches. Negative for dizziness, tremors, syncope, weakness, light-headedness and numbness.   Hematological: Negative for adenopathy. Does not bruise/bleed easily.   Psychiatric/Behavioral: Negative for confusion, sleep disturbance and suicidal ideas. The patient is not nervous/anxious.          Current Outpatient Prescriptions:   •  dexamethasone (DECADRON) 1 MG tablet, Take 1 tablet by mouth Daily With Breakfast. (Patient taking differently: Take  by mouth Daily With Breakfast.), Disp: 30 tablet, Rfl: 2  •  glipiZIDE (GLUCOTROL) 5 MG ER tablet, take 1 tablet by mouth once daily, Disp: , Rfl: 0  •  lisinopril (PRINIVIL,ZESTRIL) 40 MG tablet, Take 40 mg by mouth Daily., Disp: , Rfl:   •  metFORMIN (GLUCOPHAGE) 1000 MG tablet, Take 1,000 mg by mouth Daily., Disp: , Rfl: 0    PHYSICAL EXAMINATION:   /70  Pulse 82  Temp 98.5 °F (36.9 °C) (Temporal Artery )   Resp 16  Wt 215 lb (97.5 kg)  BMI 34.7 kg/m2   ECOG Performance Status: 1 - Symptomatic but completely ambulatory  General Appearance:  alert, cooperative, no apparent distress and appears stated age   Neurologic/Psychiatric: A&O x 3, gait steady, appropriate affect, strength 5/5 in all muscle groups   HEENT:  Normocephalic, without obvious  abnormality, mucous membranes moist   Neck: Supple, symmetrical, trachea midline, no adenopathy;  No thyromegaly, masses, or tenderness   Lungs:   Clear to auscultation bilaterally; respirations regular, even, and unlabored bilaterally   Heart:  Regular rate and rhythm, no murmurs appreciated   Abdomen:   Soft, non-tender, non-distended and no organomegaly   Lymph nodes: No cervical, supraclavicular, inguinal or axillary adenopathy noted   Extremities: Normal, atraumatic; no clubbing, cyanosis, or edema    Skin: No rashes, ulcers, or suspicious lesions noted     No visits with results within 2 Week(s) from this visit.  Latest known visit with results is:    Hospital Outpatient Visit on 02/13/2017   Component Date Value Ref Range Status   • Creatinine 02/13/2017 0.90  0.60 - 1.30 mg/dL Final    Serial Number: [id number]    : [id number]     Mri Brain With & Without Contrast    Result Date: 4/11/2017  Narrative: EXAMINATION: MRI BRAIN W WO CONTRAST-  INDICATION: Followup scan; C71.9-Malignant neoplasm of brain, unspecified.  TECHNIQUE: MR datasets of the brain were performed without and with intravenous contrast.  COMPARISON: Compared to previous MR datasets without and with contrast 02/13/2017.  FINDINGS: 1. There is a serpiginous enhancing linear or tubular abnormality or defect in the right posterior parietal and occipital region. It appears to abut the peritrigonal region of the right lateral ventricular system.  The configuration, size and perimeter enhancement of this abnormality is stable. Recurrent or progressive soft tissue component is not identified.  2. The diffusion-weighted sequence demonstrates restricted diffusion around the rightward splenium of the corpus callosum and the peritrigonal region of the right lateral ventricular system. These areas exhibit low signal on the ADC mapping exam. This pattern, although abnormal, is stable from previous exams.  3. The foramen magnum is clear. There is tonsillar  ectopia with mild crowding of the cervicomedullary junction. There is congestion of the maxillary sinuses with mucosal edema. T1 datasets are negative for hemorrhage or extracerebral collection.  4. Lastly, the contrast enhanced datasets reveal no evidence of new enhancement as described above and no new enhancement lesion is identified remotely.      Impression: 1. Stable apparent postoperative changes noted in the right posterior parietal and occipital region. Recurrent tumor or increasing enhancement is not identified. 2. Stable restricted diffusion with abnormal ADC mapping signal is seen within this right occipital and corpus callosal abnormality, worrisome but not progressed from previous exams. 3. New enhancing lesion elsewhere is not identified. Therefore the abnormalities in the right posterior parietal and occipital areas appear to be essentially stable from previous examinations of 02/13/2017. The superior sagittal sinus and dural venous sinuses are patent.   D:  04/10/2017 E:  04/11/2017  This report was finalized on 4/11/2017 9:37 AM by Dr. Jerrod Cormier MD.    (    ASSESSMENT: The patient is a very pleasant 56 y.o. female with glioblastoma multiforme.     PROBLEM LIST:   1. Presented with confusion, visual change, and disorientation.   2. Right occipital mass found on MRI brain with surrounding vasogenic edema and mass effect  3. Status post craniotomy done by Dr. Casas on 9/21/2016  4. Final pathology shows glioblastoma multiforme.   5. Started concurrent Temodar with radiation 10/17/2016.  This was completed on November 26, 2016  6.  Progressive disease documented MRI brain done December 12, 2016.  7.  Started Opdivo with Avastin 12/21/2016. Status post 9 cycle.  8.  Type 2 diabetes  9. Slightly elevated TSH  10.  Fatigue, enrolled in [place] [id number] clinical trial protocol  11.  Postmenopausal bleeding    PLAN:   1.  I will proceed with treatment today using Opdivo plus Avastin q 2 weeks.  2. The  patient will come back and see me in 2 weeks for cycle number 11.  3. We will continue Zofran as needed for nausea.  4. We will continue decadron to 2 mg daily in AM.  Patient was advised not to take a higher dose since it may interfere with her treatment.  The patient could not tolerate a lower dose.  4. We will continue colace daily.  5. I will continue to monitor patient blood work including blood counts kidney function and liver function and thyroid function.  6. I will continue Ativan as needed for anxiety.  7. I will continue Norco 5/325 mg as needed for cancer related pain.  8.  I'll stop Bactrim at this point since patient is off Temodar and she is on a very low dose Decadron.    9.  The patient is is on metformin 1000 mg. her blood sugar are better.  10.  I did go over the MRI results I reviewed the films myself went over the pictures with the patient her sister and 2 daughters I reassured them she is having a great response to treatment.  I will do a follow-up MRI brain in 3 months, this will be due July 10, 2017.  11.  Patient was made aware about potential side effects of the treatment including bleeding, thrombosis, and immune mediated reactions.  12. I will consider starting the patient on Synthroid if needed.  13.  Patient will continue study drug for [place] clinical trial protocol #[id number] looking at fatigue in GBM patient's.    13.  I'll refer patient to gynecology for postmenopausal bleeding.     Greg Montgomery MD  4/26/2017

## 2017-04-27 NOTE — TELEPHONE ENCOUNTER
Patient's sister states that they got her in to see GYN with [facility], and they are planning on doing a D&C on Monday for patient's post-menopausal bleeding. They told patient that this is not concerning and may be from the avastin, but they are going to send specimen off to pathology just to make sure. They wanted to update Dr Montgomery, and make sure that this was okay for patient to do. Advised that this will not affect her treatment, and would be okay to do on Monday.

## 2017-04-27 NOTE — TELEPHONE ENCOUNTER
----- Message from Susan Verde sent at 4/27/2017  3:56 PM EDT -----  Regarding: ROSANNA           D/C MONDAY  Contact: [phone]  PLEASE CALL PATIENT TO DISCUSS D/C THAT THIS PT WILL HAVE ON Monday.  PATIENT SAW DR MATRE YESTERDAY.

## 2017-06-07 NOTE — PROGRESS NOTES
DATE OF VISIT: 6/7/2017    REASON FOR VISIT: Followup for GBM     HISTORY OF PRESENT ILLNESS: The patient is a very pleasant 56 y.o. female  who was in her usual state of health until approximately 1 month ago. The patient presented to [facility] emergency department with complaints of disorientation, headache, loss of peripheral vision, and confusion. This has been present over the past two to three weeks. She also noted she had a general feeling of poor health over the past two months and saw her primary care physician who diagnosed her with diabetes as well as hypertension. An MRI of the brain was ordered through the ER that showed a 2x3.7 cm ring-enhancing mass to the right occipital region with surrounding vasogenic edema. The patient underwent craniotomy with tumor debulking by Dr. Casas on 9/21/2016 with final pathology revealing glioblastoma multiforme. The patient had no post-operative complications. She was started on concurrent Temodar daily with brain irradiation on 10/17/2016.  This was completed November 26, 2016.  Follow-up MRI done on December 12, 2016 showed progressive disease.  Patient was started on second line treatment using Opdivo + Avastin on 12/21/2016.  The patient is here today for cycle number 13.    SUBJECTIVE: The patient has been doing really well.  Her double vision is gone, her headaches are gone as well. She has had no skin rash, (+)nausea, no vomiting.  Patient vaginal bleeding stopped after DNC last week.      PAST MEDICAL HISTORY/SOCIAL HISTORY/FAMILY HISTORY: Unchanged from my prior documentation done on 10/07/2016    Review of Systems   Constitutional: Negative for activity change, appetite change, chills, fatigue, fever and unexpected weight change.   HENT: Negative for hearing loss, mouth sores, nosebleeds, sore throat and trouble swallowing.    Eyes: Negative for visual disturbance.   Respiratory: Negative for cough, chest tightness, shortness of breath and wheezing.     Cardiovascular: Negative for chest pain, palpitations and leg swelling.   Gastrointestinal: Positive for constipation. Negative for abdominal distention, abdominal pain, blood in stool, diarrhea, nausea, rectal pain and vomiting.   Endocrine: Negative for cold intolerance and heat intolerance.   Genitourinary: Negative for difficulty urinating, dysuria, frequency and urgency.   Musculoskeletal: Negative for arthralgias, back pain, gait problem, joint swelling and myalgias.   Skin: Negative for rash.   Neurological: Positive for headaches. Negative for dizziness, tremors, syncope, weakness, light-headedness and numbness.   Hematological: Negative for adenopathy. Does not bruise/bleed easily.   Psychiatric/Behavioral: Negative for confusion, sleep disturbance and suicidal ideas. The patient is not nervous/anxious.          Current Outpatient Prescriptions:   •  ARMODAFINIL PO, Take  by mouth., Disp: , Rfl:   •  glipiZIDE (GLUCOTROL) 5 MG ER tablet, take 1 tablet by mouth once daily, Disp: , Rfl: 0  •  hydrochlorothiazide (HYDRODIURIL) 25 MG tablet, , Disp: , Rfl: 0  •  lisinopril (PRINIVIL,ZESTRIL) 40 MG tablet, Take 40 mg by mouth Daily., Disp: , Rfl:   •  megestrol (MEGACE) 40 MG/ML suspension, , Disp: , Rfl: 0  •  metFORMIN (GLUCOPHAGE) 1000 MG tablet, Take 1,000 mg by mouth Daily., Disp: , Rfl: 0    PHYSICAL EXAMINATION:   /63  Pulse 81  Temp 97.1 °F (36.2 °C) (Temporal Artery )   Resp 19  Wt 208 lb (94.3 kg)  BMI 33.57 kg/m2   ECOG Performance Status: 1 - Symptomatic but completely ambulatory  General Appearance:  alert, cooperative, no apparent distress and appears stated age   Neurologic/Psychiatric: A&O x 3, gait steady, appropriate affect, strength 5/5 in all muscle groups   HEENT:  Normocephalic, without obvious abnormality, mucous membranes moist   Neck: Supple, symmetrical, trachea midline, no adenopathy;  No thyromegaly, masses, or tenderness   Lungs:   Clear to auscultation bilaterally;  respirations regular, even, and unlabored bilaterally   Heart:  Regular rate and rhythm, no murmurs appreciated   Abdomen:   Soft, non-tender, non-distended and no organomegaly   Lymph nodes: No cervical, supraclavicular, inguinal or axillary adenopathy noted   Extremities: Normal, atraumatic; no clubbing, cyanosis, or edema    Skin: No rashes, ulcers, or suspicious lesions noted     No visits with results within 2 Week(s) from this visit.  Latest known visit with results is:    Hospital Outpatient Visit on 02/13/2017   Component Date Value Ref Range Status   • Creatinine 02/13/2017 0.90  0.60 - 1.30 mg/dL Final    Serial Number: [id number]    : [id number]     No results found.(    ASSESSMENT: The patient is a very pleasant 56 y.o. female with glioblastoma multiforme.     PROBLEM LIST:   1. Presented with confusion, visual change, and disorientation.   2. Right occipital mass found on MRI brain with surrounding vasogenic edema and mass effect  3. Status post craniotomy done by Dr. Casas on 9/21/2016  4. Final pathology shows glioblastoma multiforme.   5. Started concurrent Temodar with radiation 10/17/2016.  This was completed on November 26, 2016  6.  Progressive disease documented MRI brain done December 12, 2016.  7.  Started Opdivo with Avastin 12/21/2016. Status post 12 cycle.  8.  Type 2 diabetes  9.  Slightly elevated TSH  10.  Fatigue, enrolled in [place] [id number] clinical trial protocol  11.  Postmenopausal bleeding  12. Weight loss with poor appetite    PLAN:   1.  I will proceed with treatment today using Opdivo plus Avastin q 2 weeks.  2. The patient will come back and see me in 2 weeks for cycle number 14.  3. We will continue Zofran as needed for nausea.  4. The patient was able to wean herself off Decadron completely.  Patient was advised not to take a higher dose since it may interfere with her treatment.    4. We will continue colace daily.  5. I will continue to monitor patient blood work including  blood counts kidney function and liver function and thyroid function.  6. I will continue Ativan as needed for anxiety.  7. I will continue Norco 5/325 mg as needed for cancer related pain.  8.  I'll stop Bactrim at this point since patient is off Temodar and she is on a very low dose Decadron.   9.  The patient is is on metformin 1000 mg. her blood sugar are better.  10. I will do a follow-up MRI brain in 3 months, this will be due July 10, 2017.  11.  Patient was made aware about potential side effects of the treatment including bleeding, thrombosis, and immune mediated reactions.  12. I will consider starting the patient on Synthroid if needed.  13.  Patient will continue study drug for [place] clinical trial protocol #780070 looking at fatigue in GBM patient's.    13. The patient will follow up with her gynecologist for postmenopausal bleeding.   14.  I will start the patient on Marinol 2.5 mg twice a day to help with her poor appetite.  The patient could not tolerate Megace.    Greg Montgomery MD  6/7/2017

## 2017-06-09 NOTE — TELEPHONE ENCOUNTER
----- Message from Susan Verde sent at 6/9/2017 10:45 AM EDT -----  Regarding: neville  pt not feeling well  Contact: [phone]  Please call pt concerning pt not feeling well.  bp really low   dehydrated

## 2017-06-09 NOTE — TELEPHONE ENCOUNTER
IVF orders per Christel ALCANTARA and Carol miller at [facility] is to schedule and notify patient/family

## 2017-06-12 NOTE — PROGRESS NOTES
Patient: Billie Bello    YOB: 1960    Date: 06/12/2017    Primary Care Provider: Dwight Wan MD    Reason for Consultation: diarrhea and weight loss    Chief complaint:   Chief Complaint   Patient presents with   • Diarrhea     Patient c/o chronic diarrhea, dehydration, and 20lb weight loss per 5 months. Patient denies blood in stool. Previous colonosopy done 3 years ago. Family hx of colon cancer.        Subjective .     History of present illness:  I did see the patient today as a consultation for evaluation and treatment of diarrhea and weight loss over the past several months.  This is made worse with eating, relieved by nothing, no pain involved, no radiation, no associated symptoms.2    Review of Systems   Constitutional: Positive for unexpected weight change (loss). Negative for chills and fever.   HENT: Negative for hearing loss, trouble swallowing and voice change.    Eyes: Negative for visual disturbance.   Respiratory: Negative for apnea, cough, chest tightness, shortness of breath and wheezing.    Cardiovascular: Negative for chest pain, palpitations and leg swelling.   Gastrointestinal: Positive for diarrhea. Negative for abdominal distention, abdominal pain, anal bleeding, blood in stool, constipation, nausea, rectal pain and vomiting.   Endocrine: Negative for cold intolerance and heat intolerance.   Genitourinary: Negative for difficulty urinating, dysuria and flank pain.   Musculoskeletal: Negative for back pain and gait problem.   Skin: Negative for color change, rash and wound.   Neurological: Positive for weakness. Negative for dizziness, syncope, speech difficulty, light-headedness, numbness and headaches.   Hematological: Negative for adenopathy. Does not bruise/bleed easily.   Psychiatric/Behavioral: Negative for confusion. The patient is not nervous/anxious.        Allergies:  No Known Allergies    Medications:    Current Outpatient Prescriptions:   •  ARMODAFINIL PO,  "Take  by mouth., Disp: , Rfl:   •  diphenoxylate-atropine (LOMOTIL) 2.5-0.025 MG per tablet, Take 1 tablet by mouth 4 (Four) Times a Day As Needed for Diarrhea., Disp: 90 tablet, Rfl: 2  •  dronabinol (MARINOL) 2.5 MG capsule, Take 1 capsule by mouth 2 (Two) Times a Day Before Meals., Disp: 60 capsule, Rfl: 0  •  glipiZIDE (GLUCOTROL) 5 MG ER tablet, take 1 tablet by mouth once daily, Disp: , Rfl: 0  •  hydrochlorothiazide (HYDRODIURIL) 25 MG tablet, , Disp: , Rfl: 0  •  lisinopril (PRINIVIL,ZESTRIL) 40 MG tablet, Take 40 mg by mouth Daily., Disp: , Rfl:   •  megestrol (MEGACE) 40 MG/ML suspension, , Disp: , Rfl: 0  •  metFORMIN (GLUCOPHAGE) 1000 MG tablet, Take 1,000 mg by mouth Daily., Disp: , Rfl: 0    History\"  Past Medical History:   Diagnosis Date   • Asthma    • Cancer     Brain   • Diabetes mellitus    • Glioblastoma    • Hypertension        Past Surgical History:   Procedure Laterality Date   • BRAIN SURGERY     • CHOLECYSTECTOMY     • CRANIOTOMY FOR TUMOR Right 9/21/2016    Procedure: CRANIOTOMY FOR TUMOR occipitoparietal;  Surgeon: Magno Casas MD;  Location: [facility];  Service:        Family History   Problem Relation Age of Onset   • Colon cancer Mother    • Diabetes Father    • Hypertension Father    • Basal cell carcinoma Sister    • Thyroid disease Sister        Social History   Substance Use Topics   • Smoking status: Never Smoker   • Smokeless tobacco: Never Used   • Alcohol use No        Objective     Vital Signs:   /70  Pulse 84  Temp 97.7 °F (36.5 °C)  Ht 66\" (167.6 cm)  Wt 207 lb (93.9 kg)  SpO2 98%  BMI 33.41 kg/m2    Physical Exam:   General Appearance:    Alert, cooperative, in no acute distress   Head:    Normocephalic, without obvious abnormality, atraumatic   Eyes:            Lids and lashes normal, conjunctivae and sclerae normal, no   icterus, no pallor, corneas clear, PERRLA   Ears:    Ears appear intact with no abnormalities noted   Throat:   No oral lesions, no " thrush, oral mucosa moist   Neck:   No adenopathy, supple, trachea midline, no thyromegaly, no   carotid bruit, no JVD   Lungs:     Clear to auscultation,respirations regular, even and                  unlabored    Heart:    Regular rhythm and normal rate, normal S1 and S2, no            murmur, no gallop, no rub, no click   Chest Wall:    No abnormalities observed   Abdomen:     Normal bowel sounds, no masses, no organomegaly, soft        non-tender, non-distended, no guarding, no rebound                tenderness   Extremities:   Moves all extremities well, no edema, no cyanosis, no             redness   Pulses:   Pulses palpable and equal bilaterally   Skin:   No bleeding, bruising or rash   Lymph nodes:   No palpable adenopathy   Neurologic:   Cranial nerves 2 - 12 grossly intact, sensation intact, DTR       present and equal bilaterally     Results Review:   None    Assessment/Plan     1. Chronic diarrhea        I did have a detailed and extensive discussion with the patient in the office today.  The full risks and benefits of operative versus nonoperative intervention were discussed with the paient, they understand, agree, and wish to proceed with the surgical treatment plan of colonoscopy.    I discussed the patients findings and my recommendations with patient.    Nadeem Almeida MD  06/15/17      .

## 2017-06-21 NOTE — PROGRESS NOTES
DATE OF VISIT: 6/21/2017    REASON FOR VISIT: Followup for GBM     HISTORY OF PRESENT ILLNESS: The patient is a very pleasant 56 y.o. female  who was in her usual state of health until approximately 1 month ago. The patient presented to [facility] emergency department with complaints of disorientation, headache, loss of peripheral vision, and confusion. This has been present over the past two to three weeks. She also noted she had a general feeling of poor health over the past two months and saw her primary care physician who diagnosed her with diabetes as well as hypertension. An MRI of the brain was ordered through the ER that showed a 2x3.7 cm ring-enhancing mass to the right occipital region with surrounding vasogenic edema. The patient underwent craniotomy with tumor debulking by Dr. Casas on 9/21/2016 with final pathology revealing glioblastoma multiforme. The patient had no post-operative complications. She was started on concurrent Temodar daily with brain irradiation on 10/17/2016.  This was completed November 26, 2016.  Follow-up MRI done on December 12, 2016 showed progressive disease.  Patient was started on second line treatment using Opdivo + Avastin on 12/21/2016.  The patient is here today for cycle number 14.     SUBJECTIVE: The patient has been doing really well.  Her double vision is gone, her headaches are gone as well. She has had no skin rash, (+)nausea, no vomiting.  Patient vaginal bleeding stopped after DNC last week.  Appetite had improved after starting Marinol, she didn't gain one pound.    PAST MEDICAL HISTORY/SOCIAL HISTORY/FAMILY HISTORY: Unchanged from my prior documentation done on 10/07/2016    Review of Systems   Constitutional: Negative for activity change, appetite change, chills, fatigue, fever and unexpected weight change.   HENT: Negative for hearing loss, mouth sores, nosebleeds, sore throat and trouble swallowing.    Eyes: Negative for visual disturbance.   Respiratory:  Negative for cough, chest tightness, shortness of breath and wheezing.    Cardiovascular: Negative for chest pain, palpitations and leg swelling.   Gastrointestinal: Positive for constipation. Negative for abdominal distention, abdominal pain, blood in stool, diarrhea, nausea, rectal pain and vomiting.   Endocrine: Negative for cold intolerance and heat intolerance.   Genitourinary: Negative for difficulty urinating, dysuria, frequency and urgency.   Musculoskeletal: Negative for arthralgias, back pain, gait problem, joint swelling and myalgias.   Skin: Negative for rash.   Neurological: Positive for headaches. Negative for dizziness, tremors, syncope, weakness, light-headedness and numbness.   Hematological: Negative for adenopathy. Does not bruise/bleed easily.   Psychiatric/Behavioral: Negative for confusion, sleep disturbance and suicidal ideas. The patient is not nervous/anxious.          Current Outpatient Prescriptions:   •  ARMODAFINIL PO, Take  by mouth., Disp: , Rfl:   •  diphenoxylate-atropine (LOMOTIL) 2.5-0.025 MG per tablet, Take 1 tablet by mouth 4 (Four) Times a Day As Needed for Diarrhea., Disp: 90 tablet, Rfl: 2  •  dronabinol (MARINOL) 2.5 MG capsule, Take 1 capsule by mouth 2 (Two) Times a Day Before Meals., Disp: 60 capsule, Rfl: 0  •  glipiZIDE (GLUCOTROL) 5 MG ER tablet, take 1 tablet by mouth once daily, Disp: , Rfl: 0  •  hydrochlorothiazide (HYDRODIURIL) 25 MG tablet, , Disp: , Rfl: 0  •  lisinopril (PRINIVIL,ZESTRIL) 40 MG tablet, Take 40 mg by mouth Daily., Disp: , Rfl:   •  megestrol (MEGACE) 40 MG/ML suspension, , Disp: , Rfl: 0  •  metFORMIN (GLUCOPHAGE) 1000 MG tablet, Take 1,000 mg by mouth Daily., Disp: , Rfl: 0    PHYSICAL EXAMINATION:   /90  Pulse 67  Temp 98 °F (36.7 °C) (Temporal Artery )   Resp 16  Wt 208 lb (94.3 kg)  BMI 33.57 kg/m2   ECOG Performance Status: 1 - Symptomatic but completely ambulatory  General Appearance:  alert, cooperative, no apparent distress  and appears stated age   Neurologic/Psychiatric: A&O x 3, gait steady, appropriate affect, strength 5/5 in all muscle groups   HEENT:  Normocephalic, without obvious abnormality, mucous membranes moist   Neck: Supple, symmetrical, trachea midline, no adenopathy;  No thyromegaly, masses, or tenderness   Lungs:   Clear to auscultation bilaterally; respirations regular, even, and unlabored bilaterally   Heart:  Regular rate and rhythm, no murmurs appreciated   Abdomen:   Soft, non-tender, non-distended and no organomegaly   Lymph nodes: No cervical, supraclavicular, inguinal or axillary adenopathy noted   Extremities: Normal, atraumatic; no clubbing, cyanosis, or edema    Skin: No rashes, ulcers, or suspicious lesions noted     No visits with results within 2 Week(s) from this visit.  Latest known visit with results is:    Hospital Outpatient Visit on 02/13/2017   Component Date Value Ref Range Status   • Creatinine 02/13/2017 0.90  0.60 - 1.30 mg/dL Final    Serial Number: [id number]    : [id number]     No results found.(    ASSESSMENT: The patient is a very pleasant 56 y.o. female with glioblastoma multiforme.     PROBLEM LIST:   1. Presented with confusion, visual change, and disorientation.   2. Right occipital mass found on MRI brain with surrounding vasogenic edema and mass effect  3. Status post craniotomy done by Dr. Casas on 9/21/2016  4. Final pathology shows glioblastoma multiforme.   5. Started concurrent Temodar with radiation 10/17/2016.  This was completed on November 26, 2016  6.  Progressive disease documented MRI brain done December 12, 2016.  7.  Started Opdivo with Avastin 12/21/2016. Status post 13 cycle.  8.  Type 2 diabetes  9.  Slightly elevated TSH  10.  Fatigue, enrolled in [place] [id number] clinical trial protocol  11.  Postmenopausal bleeding  12. Weight loss with poor appetite    PLAN:   1.  I will proceed with treatment today using Opdivo plus Avastin q 2 weeks.  2. The patient will come  back and see me in 2 weeks for cycle number 15.  3. We will continue Zofran as needed for nausea.  4. The patient was able to wean herself off Decadron completely.  Patient was advised not to take a higher dose since it may interfere with her treatment.    4. We will continue colace daily.  5. I will continue to monitor patient blood work including blood counts kidney function and liver function and thyroid function.  6. I will continue Ativan as needed for anxiety.  7. I will continue Norco 5/325 mg as needed for cancer related pain.  8.  I'll stop Bactrim at this point since patient is off Temodar and she is on a very low dose Decadron.   9.  The patient is is on metformin 1000 mg. her blood sugar are better.  10. I will do a follow-up MRI brain in 3 months, this will be due July 10, 2017.  11.  Patient was made aware about potential side effects of the treatment including bleeding, thrombosis, and immune mediated reactions.  12. I will consider starting the patient on Synthroid if needed.  13.  Patient will continue study drug for [place] clinical trial protocol #240341 looking at fatigue in GBM patient's.    13. The patient will follow up with her gynecologist for postmenopausal bleeding.   14.  I will continue the patient on Marinol 2.5 mg twice a day to help with her poor appetite.  The patient could not tolerate Megace.    Greg Montgomery MD  6/21/2017

## 2017-07-05 NOTE — PROGRESS NOTES
DATE OF VISIT: 7/5/2017    REASON FOR VISIT: Followup for GBM     HISTORY OF PRESENT ILLNESS: The patient is a very pleasant 56 y.o. female  who was in her usual state of health until approximately 1 month ago. The patient presented to [facility] emergency department with complaints of disorientation, headache, loss of peripheral vision, and confusion. This has been present over the past two to three weeks. She also noted she had a general feeling of poor health over the past two months and saw her primary care physician who diagnosed her with diabetes as well as hypertension. An MRI of the brain was ordered through the ER that showed a 2x3.7 cm ring-enhancing mass to the right occipital region with surrounding vasogenic edema. The patient underwent craniotomy with tumor debulking by Dr. Casas on 9/21/2016 with final pathology revealing glioblastoma multiforme. The patient had no post-operative complications. She was started on concurrent Temodar daily with brain irradiation on 10/17/2016.  This was completed November 26, 2016.  Follow-up MRI done on December 12, 2016 showed progressive disease.  Patient was started on second line treatment using Opdivo + Avastin on 12/21/2016.  The patient is here today for cycle number 15.     SUBJECTIVE: The patient has been doing really well.  Her double vision is gone, her headaches are gone as well. She has had no skin rash, (+)nausea, no vomiting.  Patient vaginal bleeding stopped.  She is recovering from GI infection that caused significant diarrhea for appetite and approximately 10 pounds weight loss.      PAST MEDICAL HISTORY/SOCIAL HISTORY/FAMILY HISTORY: Unchanged from my prior documentation done on 10/07/2016    Review of Systems   Constitutional: Negative for activity change, appetite change, chills, fatigue, fever and unexpected weight change.   HENT: Negative for hearing loss, mouth sores, nosebleeds, sore throat and trouble swallowing.    Eyes: Negative for  visual disturbance.   Respiratory: Negative for cough, chest tightness, shortness of breath and wheezing.    Cardiovascular: Negative for chest pain, palpitations and leg swelling.   Gastrointestinal: Positive for constipation. Negative for abdominal distention, abdominal pain, blood in stool, diarrhea, nausea, rectal pain and vomiting.   Endocrine: Negative for cold intolerance and heat intolerance.   Genitourinary: Negative for difficulty urinating, dysuria, frequency and urgency.   Musculoskeletal: Negative for arthralgias, back pain, gait problem, joint swelling and myalgias.   Skin: Negative for rash.   Neurological: Positive for headaches. Negative for dizziness, tremors, syncope, weakness, light-headedness and numbness.   Hematological: Negative for adenopathy. Does not bruise/bleed easily.   Psychiatric/Behavioral: Negative for confusion, sleep disturbance and suicidal ideas. The patient is not nervous/anxious.          Current Outpatient Prescriptions:   •  dronabinol (MARINOL) 2.5 MG capsule, Take 1 capsule by mouth 2 (Two) Times a Day Before Meals., Disp: 60 capsule, Rfl: 0  •  glipiZIDE (GLUCOTROL) 5 MG ER tablet, take 1 tablet by mouth once daily, Disp: , Rfl: 0  •  lisinopril (PRINIVIL,ZESTRIL) 40 MG tablet, Take 40 mg by mouth Daily., Disp: , Rfl:   •  metFORMIN (GLUCOPHAGE) 1000 MG tablet, Take 1,000 mg by mouth Daily., Disp: , Rfl: 0    PHYSICAL EXAMINATION:   /67  Pulse 75  Temp 97.1 °F (36.2 °C) (Temporal Artery )   Resp 16  Wt 200 lb (90.7 kg)  BMI 32.28 kg/m2   ECOG Performance Status: 1 - Symptomatic but completely ambulatory  General Appearance:  alert, cooperative, no apparent distress and appears stated age   Neurologic/Psychiatric: A&O x 3, gait steady, appropriate affect, strength 5/5 in all muscle groups   HEENT:  Normocephalic, without obvious abnormality, mucous membranes moist   Neck: Supple, symmetrical, trachea midline, no adenopathy;  No thyromegaly, masses, or tenderness    Lungs:   Clear to auscultation bilaterally; respirations regular, even, and unlabored bilaterally   Heart:  Regular rate and rhythm, no murmurs appreciated   Abdomen:   Soft, non-tender, non-distended and no organomegaly   Lymph nodes: No cervical, supraclavicular, inguinal or axillary adenopathy noted   Extremities: Normal, atraumatic; no clubbing, cyanosis, or edema    Skin: No rashes, ulcers, or suspicious lesions noted     No visits with results within 2 Week(s) from this visit.  Latest known visit with results is:    Hospital Outpatient Visit on 02/13/2017   Component Date Value Ref Range Status   • Creatinine 02/13/2017 0.90  0.60 - 1.30 mg/dL Final    Serial Number: [id number]    : [id number]     No results found.(    ASSESSMENT: The patient is a very pleasant 56 y.o. female with glioblastoma multiforme.     PROBLEM LIST:   1. Presented with confusion, visual change, and disorientation.   2. Right occipital mass found on MRI brain with surrounding vasogenic edema and mass effect  3. Status post craniotomy done by Dr. Casas on 9/21/2016  4. Final pathology shows glioblastoma multiforme.   5. Started concurrent Temodar with radiation 10/17/2016.  This was completed on November 26, 2016  6.  Progressive disease documented MRI brain done December 12, 2016.  7.  Started Opdivo with Avastin 12/21/2016. Status post 14 cycle.  8.  Type 2 diabetes  9.  Slightly elevated TSH  10.  Fatigue, enrolled in [place] [id number] clinical trial protocol  11.  Postmenopausal bleeding  12. Weight loss with poor appetite    PLAN:   1.  I will proceed with treatment today using Opdivo plus Avastin q 2 weeks.  2. The patient will come back and see me in 2 weeks for cycle number 16.  3. We will continue Zofran as needed for nausea.  4. The patient was able to wean herself off Decadron completely.  Patient was advised not to take a higher dose since it may interfere with her treatment.    4. We will continue colace daily.  5. I will  continue to monitor patient blood work including blood counts kidney function and liver function and thyroid function.  6. I will continue Ativan as needed for anxiety.  7. I will continue Norco 5/325 mg as needed for cancer related pain.  8.  I'll stop Bactrim since patient is off Temodar and she is off Decadron.   9.  The patient is is on metformin 1000 mg. her blood sugar are better.  10. I will do a follow-up MRI brain in 3 months, this will be due July 10, 2017.  I will order this prior to return.  11.  Patient was made aware about potential side effects of the treatment including bleeding, thrombosis, and immune mediated reactions.  12. I will consider starting the patient on Synthroid if needed.  13.  Patient will continue study drug for [place] clinical trial protocol #[id number] looking at fatigue in GBM patient's.    13. The patient will follow up with her gynecologist for postmenopausal bleeding.   14.  I will continue the patient on Marinol 2.5 mg twice a day to help with her poor appetite.  The patient could not tolerate Megace.  I will go ahead and refill it today.    Greg Montgomery MD  7/5/2017

## 2017-07-19 NOTE — PROGRESS NOTES
DATE OF VISIT: 7/19/2017    REASON FOR VISIT: Followup for GBM     HISTORY OF PRESENT ILLNESS: The patient is a very pleasant 56 y.o. female  who was in her usual state of health until approximately 1 month ago. The patient presented to [facility] emergency department with complaints of disorientation, headache, loss of peripheral vision, and confusion. This has been present over the past two to three weeks. She also noted she had a general feeling of poor health over the past two months and saw her primary care physician who diagnosed her with diabetes as well as hypertension. An MRI of the brain was ordered through the ER that showed a 2x3.7 cm ring-enhancing mass to the right occipital region with surrounding vasogenic edema. The patient underwent craniotomy with tumor debulking by Dr. Casas on 9/21/2016 with final pathology revealing glioblastoma multiforme. The patient had no post-operative complications. She was started on concurrent Temodar daily with brain irradiation on 10/17/2016.  This was completed November 26, 2016.  Follow-up MRI done on December 12, 2016 showed progressive disease.  Patient was started on second line treatment using Opdivo + Avastin on 12/21/2016.  The patient is here today for cycle number 16.     SUBJECTIVE: The patient has been doing really well.  Her double vision is gone, her headaches are gone as well. She has had no skin rash, (+)nausea, no vomiting.  Patient vaginal bleeding stopped.  She is recovering from GI infection that caused significant diarrhea for appetite and approximately 10 pounds weight loss.      PAST MEDICAL HISTORY/SOCIAL HISTORY/FAMILY HISTORY: Unchanged from my prior documentation done on 10/07/2016    Review of Systems   Constitutional: Negative for activity change, appetite change, chills, fatigue, fever and unexpected weight change.   HENT: Negative for hearing loss, mouth sores, nosebleeds, sore throat and trouble swallowing.    Eyes: Negative for  visual disturbance.   Respiratory: Negative for cough, chest tightness, shortness of breath and wheezing.    Cardiovascular: Negative for chest pain, palpitations and leg swelling.   Gastrointestinal: Positive for constipation. Negative for abdominal distention, abdominal pain, blood in stool, diarrhea, nausea, rectal pain and vomiting.   Endocrine: Negative for cold intolerance and heat intolerance.   Genitourinary: Negative for difficulty urinating, dysuria, frequency and urgency.   Musculoskeletal: Negative for arthralgias, back pain, gait problem, joint swelling and myalgias.   Skin: Negative for rash.   Neurological: Positive for headaches. Negative for dizziness, tremors, syncope, weakness, light-headedness and numbness.   Hematological: Negative for adenopathy. Does not bruise/bleed easily.   Psychiatric/Behavioral: Negative for confusion, sleep disturbance and suicidal ideas. The patient is not nervous/anxious.          Current Outpatient Prescriptions:   •  dronabinol (MARINOL) 2.5 MG capsule, Take 1 capsule by mouth 2 (Two) Times a Day Before Meals., Disp: 60 capsule, Rfl: 0  •  glipiZIDE (GLUCOTROL) 5 MG ER tablet, take 1 tablet by mouth once daily, Disp: , Rfl: 0  •  lisinopril (PRINIVIL,ZESTRIL) 40 MG tablet, Take 40 mg by mouth Daily., Disp: , Rfl:   •  metFORMIN (GLUCOPHAGE) 1000 MG tablet, Take 1,000 mg by mouth Daily., Disp: , Rfl: 0  No current facility-administered medications for this visit.     Facility-Administered Medications Ordered in Other Visits:   •  bevacizumab (AVASTIN) 1,050 mg in sodium chloride 0.9 % 142 mL chemo IVPB, 10 mg/kg (Treatment Plan Recorded), Intravenous, Once, Greg Montgomery MD  •  nivolumab (OPDIVO) 240 mg in sodium chloride 0.9 % 124 mL chemo IVPB, 240 mg, Intravenous, Once, Greg Montgomery MD  •  sodium chloride 0.9 % infusion 250 mL, 250 mL, Intravenous, Once, Greg Montgomery MD  •  sodium chloride 0.9 % infusion 250 mL, 250 mL, Intravenous, Once, Greg Montgomery  MD    PHYSICAL EXAMINATION:   /77  Pulse 71  Temp 97.1 °F (36.2 °C) (Temporal Artery )   Resp 16  Wt 197 lb (89.4 kg)  BMI 30.85 kg/m2   ECOG Performance Status: 1 - Symptomatic but completely ambulatory  General Appearance:  alert, cooperative, no apparent distress and appears stated age   Neurologic/Psychiatric: A&O x 3, gait steady, appropriate affect, strength 5/5 in all muscle groups   HEENT:  Normocephalic, without obvious abnormality, mucous membranes moist   Neck: Supple, symmetrical, trachea midline, no adenopathy;  No thyromegaly, masses, or tenderness   Lungs:   Clear to auscultation bilaterally; respirations regular, even, and unlabored bilaterally   Heart:  Regular rate and rhythm, no murmurs appreciated   Abdomen:   Soft, non-tender, non-distended and no organomegaly   Lymph nodes: No cervical, supraclavicular, inguinal or axillary adenopathy noted   Extremities: Normal, atraumatic; no clubbing, cyanosis, or edema    Skin: No rashes, ulcers, or suspicious lesions noted     Hospital Outpatient Visit on 07/17/2017   Component Date Value Ref Range Status   • Creatinine 07/17/2017 1.00  0.60 - 1.30 mg/dL Final    Serial Number: [id number]:  [id number]     Mri Brain With & Without Contrast    Result Date: 7/17/2017  Narrative: EXAMINATION: MRI BRAIN WITH AND WITHOUT CONTRAST-07/17/2017:  INDICATION: F/U scan; C71.9-Malignant neoplasm of brain, unspecified, followup and restaging malignancy.  TECHNIQUE:  MR data sets of the brain were performed without and with intravenous contrast.  COMPARISON:  Comparison is made to 04/10/2017 data sets.  FINDINGS: 1.  Over the 3 month interval, there has been progression of disease.  Specifically, there is now enhancing lesion or tumor in both the leftward central and rightward splenium of the corpus callosum, an entirely new finding. There is an inhomogeneous enhancing mass with multiple daughter nodules and enhancing abnormality noted in the peritrigonal  region of the right posterior parietal region extending into the rightward occipital and parieto-occipital area. The mass and the degree of enhancement has increased diffusely.  The lesion extends from the posterior convexity down to the corpus callosum and the peritrigonal region on the right.  Midline shift or high grade mass effect is not identified otherwise. Petechial hemorrhages are seen in the area of tumor and these are stable from previous studies.  2. The amount of edema and white matter gliotic reaction has increased considerably.      Impression: Evidence of increasing extent and enhancement of the neoplastic process in the right occipital and posterior parietal occipital region which now extends through the rightward peritrigonal region into the splenium of the corpus callosum. Evidence of progressive disease is noted over the 3 month interval since previous studies of 04/10/2017. There is no evidence of significant new hemorrhage although petechial hemorrhages are noted within the tumor. There is restricted diffusion in this region. There is no high grade mass effect or midline shift but the T2 and FLAIR increased gliotic white matter signal with some element of diffuse edema has increased as well.  D:  07/17/2017 E:  07/17/2017     This report was finalized on 7/17/2017 6:03 PM by Dr. Jerrod Cormier MD.    (    ASSESSMENT: The patient is a very pleasant 56 y.o. female with glioblastoma multiforme.     PROBLEM LIST:   1. Presented with confusion, visual change, and disorientation.   2. Right occipital mass found on MRI brain with surrounding vasogenic edema and mass effect  3. Status post craniotomy done by Dr. Casas on 9/21/2016  4. Final pathology shows glioblastoma multiforme.   5. Started concurrent Temodar with radiation 10/17/2016.  This was completed on November 26, 2016  6.  Progressive disease documented MRI brain done December 12, 2016.  7.  Started Opdivo with Avastin 12/21/2016. Status  post 15 cycle.  8.  Type 2 diabetes  9.  Slightly elevated TSH  10.  Fatigue, enrolled in [place] [id number] clinical trial protocol  11.  Postmenopausal bleeding  12. Weight loss with poor appetite    PLAN:   1.  I did go over the MRI brain results with the patient and her sister, I reviewed the films myself, I have compared her current MRI to previous study done April 2017, I went over the pictures with the patient.  Unfortunately patient has evidence of progressive disease with increased contrast enhancement of the tumor primary site as well as extension beyond the midline.  There is mild surrounding edema.    2.  At this point only approved treatment is Irinotecan  however response rate are going to be less than 15%.   3.  I will go ahead and check her tumor for next gene sequencing, if she does have actionable mutation will try to get targeted treatment approved through her insurance.  On the other hand if there are no further treatment options I will refer the patient for clinical trial since she continued to have excellent performance status.    4. I will proceed with treatment today using Opdivo plus Avastin.   5. The patient will come back and see me in 2 weeks to go over the results.  6. We will continue Zofran as needed for nausea.  7. The patient was able to wean herself off Decadron completely.  Patient was advised not to take a higher dose since it may interfere with her treatment.    8. We will continue colace daily.  9. I will continue to monitor patient blood work including blood counts kidney function and liver function and thyroid function.  10. I will continue Ativan as needed for anxiety.  11. I will continue Norco 5/325 mg as needed for cancer related pain.  12.  I'll stop Bactrim since patient is off Temodar and she is off Decadron.   13.  The patient is is on metformin 1000 mg. her blood sugar are better.  14.   Patient was made aware about potential side effects of the treatment including  bleeding, thrombosis, and immune mediated reactions.  15. I will consider starting the patient on Synthroid if needed.  16.  Patient will continue study drug for [place] clinical trial protocol #[id number] looking at fatigue in GBM patient's.    17. The patient will follow up with her gynecologist for postmenopausal bleeding.   18.  I will continue the patient on Marinol 2.5 mg twice a day to help with her poor appetite.  The patient could not tolerate Megace.     Greg Montgomery MD  7/19/2017

## 2017-07-21 NOTE — TELEPHONE ENCOUNTER
----- Message from Melvi Cheng MA sent at 7/21/2017 12:28 PM EDT -----  Regarding: [place]- family needs clarification, ready to discuss options  Contact: [phone]  Pt sister Quiana, states she is still trying to absorb all the information they rec'd regarding the pt's diagnosis. They are unsure of what they need to be doing right now and they need clarification on a few things before pressing forward. They are ready to discuss options further in detail.     Thanks!

## 2017-07-21 NOTE — TELEPHONE ENCOUNTER
Spoke with Quiana and encouraged her that we will stick with the current plan: Rony testing sent for gene analysis, and depending on the results, we will treat her vs. Send her to Dr. Reese with [facility] for treatment  To call back if any further questions or concerns

## 2017-08-02 NOTE — PROGRESS NOTES
DATE OF VISIT: 8/2/2017    REASON FOR VISIT: Followup for GBM     HISTORY OF PRESENT ILLNESS: The patient is a very pleasant 56 y.o. female  who was in her usual state of health until approximately 1 month ago. The patient presented to [facility] emergency department with complaints of disorientation, headache, loss of peripheral vision, and confusion. This has been present over the past two to three weeks. She also noted she had a general feeling of poor health over the past two months and saw her primary care physician who diagnosed her with diabetes as well as hypertension. An MRI of the brain was ordered through the ER that showed a 2x3.7 cm ring-enhancing mass to the right occipital region with surrounding vasogenic edema. The patient underwent craniotomy with tumor debulking by Dr. Casas on 9/21/2016 with final pathology revealing glioblastoma multiforme. The patient had no post-operative complications. She was started on concurrent Temodar daily with brain irradiation on 10/17/2016.  This was completed November 26, 2016.  Follow-up MRI done on December 12, 2016 showed progressive disease.  Patient was started on second line treatment using Opdivo + Avastin on 12/21/2016.  She completed 16 cycles.  Follow-up MRI brain done on July 17, 2017 showed progressive disease.  The patient is here today to discuss future treatment options.     SUBJECTIVE: The patient has been doing really well.  Her double vision is mild and intermittent, her headaches are gone as well. She has had no skin rash, (+)nausea, no vomiting.  Patient vaginal bleeding stopped.        PAST MEDICAL HISTORY/SOCIAL HISTORY/FAMILY HISTORY: Unchanged from my prior documentation done on 10/07/2016    Review of Systems   Constitutional: Negative for activity change, appetite change, chills, fatigue, fever and unexpected weight change.   HENT: Negative for hearing loss, mouth sores, nosebleeds, sore throat and trouble swallowing.    Eyes: Negative  for visual disturbance.   Respiratory: Negative for cough, chest tightness, shortness of breath and wheezing.    Cardiovascular: Negative for chest pain, palpitations and leg swelling.   Gastrointestinal: Positive for constipation. Negative for abdominal distention, abdominal pain, blood in stool, diarrhea, nausea, rectal pain and vomiting.   Endocrine: Negative for cold intolerance and heat intolerance.   Genitourinary: Negative for difficulty urinating, dysuria, frequency and urgency.   Musculoskeletal: Negative for arthralgias, back pain, gait problem, joint swelling and myalgias.   Skin: Negative for rash.   Neurological: Positive for headaches. Negative for dizziness, tremors, syncope, weakness, light-headedness and numbness.   Hematological: Negative for adenopathy. Does not bruise/bleed easily.   Psychiatric/Behavioral: Negative for confusion, sleep disturbance and suicidal ideas. The patient is not nervous/anxious.          Current Outpatient Prescriptions:   •  dronabinol (MARINOL) 2.5 MG capsule, Take 1 capsule by mouth 2 (Two) Times a Day Before Meals., Disp: 60 capsule, Rfl: 0  •  glipiZIDE (GLUCOTROL) 5 MG ER tablet, take 1 tablet by mouth once daily, Disp: , Rfl: 0  •  lisinopril (PRINIVIL,ZESTRIL) 40 MG tablet, Take 40 mg by mouth Daily., Disp: , Rfl:   •  metFORMIN (GLUCOPHAGE) 1000 MG tablet, Take 1,000 mg by mouth Daily., Disp: , Rfl: 0  No current facility-administered medications for this visit.     Facility-Administered Medications Ordered in Other Visits:   •  sodium chloride 0.9 % infusion 250 mL, 250 mL, Intravenous, Once, Greg Montgomery MD    PHYSICAL EXAMINATION:   BP (!) 183/84  Pulse 77  Temp 97.2 °F (36.2 °C) (Temporal Artery )   Resp 19  Wt 192 lb (87.1 kg)  BMI 30.07 kg/m2   ECOG Performance Status: 1 - Symptomatic but completely ambulatory  General Appearance:  alert, cooperative, no apparent distress and appears stated age   Neurologic/Psychiatric: A&O x 3, gait steady,  appropriate affect, strength 5/5 in all muscle groups   HEENT:  Normocephalic, without obvious abnormality, mucous membranes moist   Neck: Supple, symmetrical, trachea midline, no adenopathy;  No thyromegaly, masses, or tenderness   Lungs:   Clear to auscultation bilaterally; respirations regular, even, and unlabored bilaterally   Heart:  Regular rate and rhythm, no murmurs appreciated   Abdomen:   Soft, non-tender, non-distended and no organomegaly   Lymph nodes: No cervical, supraclavicular, inguinal or axillary adenopathy noted   Extremities: Normal, atraumatic; no clubbing, cyanosis, or edema    Skin: No rashes, ulcers, or suspicious lesions noted     No visits with results within 2 Week(s) from this visit.  Latest known visit with results is:    Hospital Outpatient Visit on 07/17/2017   Component Date Value Ref Range Status   • Creatinine 07/17/2017 1.00  0.60 - 1.30 mg/dL Final    Serial Number: [id number]:  [id number]     Mri Brain With & Without Contrast    Result Date: 7/17/2017  Narrative: EXAMINATION: MRI BRAIN WITH AND WITHOUT CONTRAST-07/17/2017:  INDICATION: F/U scan; C71.9-Malignant neoplasm of brain, unspecified, followup and restaging malignancy.  TECHNIQUE:  MR data sets of the brain were performed without and with intravenous contrast.  COMPARISON:  Comparison is made to 04/10/2017 data sets.  FINDINGS: 1.  Over the 3 month interval, there has been progression of disease.  Specifically, there is now enhancing lesion or tumor in both the leftward central and rightward splenium of the corpus callosum, an entirely new finding. There is an inhomogeneous enhancing mass with multiple daughter nodules and enhancing abnormality noted in the peritrigonal region of the right posterior parietal region extending into the rightward occipital and parieto-occipital area. The mass and the degree of enhancement has increased diffusely.  The lesion extends from the posterior convexity down to the corpus callosum  and the peritrigonal region on the right.  Midline shift or high grade mass effect is not identified otherwise. Petechial hemorrhages are seen in the area of tumor and these are stable from previous studies.  2. The amount of edema and white matter gliotic reaction has increased considerably.      Impression: Evidence of increasing extent and enhancement of the neoplastic process in the right occipital and posterior parietal occipital region which now extends through the rightward peritrigonal region into the splenium of the corpus callosum. Evidence of progressive disease is noted over the 3 month interval since previous studies of 04/10/2017. There is no evidence of significant new hemorrhage although petechial hemorrhages are noted within the tumor. There is restricted diffusion in this region. There is no high grade mass effect or midline shift but the T2 and FLAIR increased gliotic white matter signal with some element of diffuse edema has increased as well.  D:  07/17/2017 E:  07/17/2017     This report was finalized on 7/17/2017 6:03 PM by Dr. Jerrod Cormier MD.    (    ASSESSMENT: The patient is a very pleasant 56 y.o. female with glioblastoma multiforme.     PROBLEM LIST:   1. Presented with confusion, visual change, and disorientation.   2. Right occipital mass found on MRI brain with surrounding vasogenic edema and mass effect  3. Status post craniotomy done by Dr. Casas on 9/21/2016  4. Final pathology shows glioblastoma multiforme.   5. Started concurrent Temodar with radiation 10/17/2016.  This was completed on November 26, 2016  6.  Progressive disease documented MRI brain done December 12, 2016.  7.  Started Opdivo with Avastin 12/21/2016. Status post 15 cycle.  8.  Type 2 diabetes  9.  Slightly elevated TSH  10.  Fatigue, enrolled in [place] [id number] clinical trial protocol  11.  Postmenopausal bleeding  12. Weight loss with poor appetite    PLAN:   1.  I did go over the MRI brain results with the  patient and her sister again, Unfortunately patient has evidence of progressive disease with increased contrast enhancement of the tumor primary site as well as extension beyond the midline.  There is mild surrounding edema.    2.  At this point only approved treatment is Irinotecan  however response rate are going to be less than 10%.   3.  I will follow up on next gene sequencing result. If she does have actionable mutation will try to get targeted treatment approved through her insurance.    4. I will refer the patient for clinical trial since she continued to have excellent performance status.  I have discussed the case with Dr. Reese from [facility] in [facility] to coordinate patient's care, who kindly agreed to see the patient.   5. The patient will come back and see me in 2 weeks to go over the results.  6. We will continue Zofran as needed for nausea.  7. The patient was able to wean herself off Decadron completely.  Patient was advised not to take a higher dose since it may interfere with her treatment.    8. We will continue colace daily.  9. I will continue to monitor patient blood work including blood counts kidney function and liver function and thyroid function.  10. I will continue Ativan as needed for anxiety.  11. I will continue Norco 5/325 mg as needed for cancer related pain.  12.  I'll stop Bactrim since patient is off Temodar and she is off Decadron.   13.  The patient is is on metformin 1000 mg. her blood sugar are better.  14.   Patient will continue study drug for [place] clinical trial protocol #[id number] looking at fatigue in GBM patient's.    15.  I will continue the patient on Marinol 2.5 mg twice a day to help with her poor appetite.  The patient could not tolerate Megace.     Greg Montgomery MD  8/2/2017

## 2017-08-07 NOTE — TELEPHONE ENCOUNTER
Quiana states that they have not heard anything about their appointment with Dr. Reese in [place], and they were to call back if this was not set up. Spoke with our scheduling, and was told by them that it would be at least 2 weeks before we could get them an appointment.   Advised Quiana that I will discuss this with Dr. Montgomery, and call her back with his recommendation.

## 2017-08-07 NOTE — TELEPHONE ENCOUNTER
----- Message from Ashley Phillips sent at 8/7/2017  9:31 AM EDT -----  Regarding: BADIN - SYMPTOMS  Contact: [phone]  REX, SISTER CALLED REGARDING SYMPTOMS; SHE IS REALLY UNSTEADY AND HAS BECOME WORSE, AND BALANCE IS OFF.       PLEASE CALL

## 2017-08-07 NOTE — TELEPHONE ENCOUNTER
Scheduling got an appt with Dr. Reese in [place] for tomorrow @ [id number]. Quiana informed, and also advised her that scheduling will call her today with more details re: location, parking, etc.

## 2017-08-09 NOTE — TELEPHONE ENCOUNTER
----- Message from Tonya Sosa sent at 8/9/2017 11:13 AM EDT -----  Regarding: BADIN-MEDICATION  Contact: [phone]  Quiana patient's sister called she has lost 6 lbs in a week can Marinol with a higher dosage be called in to help her appetite 2.5 is not working.

## 2017-08-09 NOTE — TELEPHONE ENCOUNTER
Patient advised that she may take two of the marinol 2.5mg twice daily to see if it will help with her appetite.   Encouraged to keep appt as scheduled to discuss Dr. Reese's visit as well as to go over the [facility] testing that was performed

## 2017-08-23 NOTE — TELEPHONE ENCOUNTER
Advised that we will get patient in for IV fluids. She is currently in the office with Dr. Reese, and unable to tell me what symptoms she is having at this time.   Scheduling will call patient with appointment

## 2017-08-23 NOTE — TELEPHONE ENCOUNTER
----- Message from Tonya Sosa sent at 8/23/2017  3:16 PM EDT -----  Regarding: BADIN-ORDER FOR AN IV FLUIDS  Contact: [phone]  Annita with Dr. Reese office called and patient needs to be setup for IV fluids? Please call.